# Patient Record
Sex: FEMALE | Race: WHITE | NOT HISPANIC OR LATINO | Employment: FULL TIME | URBAN - METROPOLITAN AREA
[De-identification: names, ages, dates, MRNs, and addresses within clinical notes are randomized per-mention and may not be internally consistent; named-entity substitution may affect disease eponyms.]

---

## 2017-01-30 ENCOUNTER — TELEPHONE (OUTPATIENT)
Dept: OBSTETRICS AND GYNECOLOGY | Facility: CLINIC | Age: 27
End: 2017-01-30

## 2017-01-30 RX ORDER — NORGESTIMATE AND ETHINYL ESTRADIOL 0.25-0.035
1 KIT ORAL DAILY
Qty: 28 TABLET | Refills: 11 | Status: SHIPPED | OUTPATIENT
Start: 2017-01-30 | End: 2017-02-27

## 2017-01-30 NOTE — TELEPHONE ENCOUNTER
Rx sent.    ----- Message from Amber Sims sent at 1/30/2017 11:09 AM EST -----  Contact: PATIENT  PATIENT CALLED REQUESTING SPRINTEC RX.

## 2018-02-23 ENCOUNTER — OFFICE VISIT (OUTPATIENT)
Dept: CARDIOLOGY | Facility: CLINIC | Age: 28
End: 2018-02-23

## 2018-02-23 VITALS
SYSTOLIC BLOOD PRESSURE: 136 MMHG | HEIGHT: 63 IN | HEART RATE: 77 BPM | BODY MASS INDEX: 27.64 KG/M2 | WEIGHT: 156 LBS | DIASTOLIC BLOOD PRESSURE: 86 MMHG

## 2018-02-23 DIAGNOSIS — I10 ESSENTIAL HYPERTENSION: ICD-10-CM

## 2018-02-23 DIAGNOSIS — R00.2 PALPITATIONS: Primary | ICD-10-CM

## 2018-02-23 PROCEDURE — 99204 OFFICE O/P NEW MOD 45 MIN: CPT | Performed by: INTERNAL MEDICINE

## 2018-02-23 PROCEDURE — 93000 ELECTROCARDIOGRAM COMPLETE: CPT | Performed by: INTERNAL MEDICINE

## 2018-02-23 RX ORDER — LOSARTAN POTASSIUM AND HYDROCHLOROTHIAZIDE 12.5; 5 MG/1; MG/1
TABLET ORAL
COMMUNITY
Start: 2018-01-26 | End: 2018-03-21 | Stop reason: SDUPTHER

## 2018-02-25 NOTE — PROGRESS NOTES
Date of Office Visit: 2018  Encounter Provider: Henrik Ramirez MD  Place of Service: [facility] CARDIOLOGY  Patient Name: Iwona Navarro  :1990    Chief complaint: Palpitations, hypertension.     History of Present Illness:    I had the pleasure of seeing the patient in cardiology office on 2018.  She is a   very pleasant 27 year-old white female with a history of hypertension who presents   for evaluation.  The patient was originally diagnosed with hypertension in ,   which time she states that her blood pressure was 164/123 at one time.  She was   started initially on lisinopril, although she developed a significant cough.  This was   later changed to losartan, and most recently was changed to losartan/HCTZ for   continuing hypertension in 2017.  Her blood pressure readings in   2018 continued to be elevated at times, including diastolic readings as high   as the lower 104, and systolic readings as high as 154.  However, recently, she   states that her blood pressure mainly runs in the upper 130s over upper 80s. Today,   she is 136/86.  She has also had palpitations intermittently which she states are fairly   mild.  She states that these are not severe, and she notices them more frequently   while in bed.  She has not had any shortness of breath or chest discomfort.  Her   EKG from today is normal other than sinus arrhythmia.    Past Medical History:   Diagnosis Date   • ACE-inhibitor cough    • Hypertension    • Migraines    • Palpitations        No past surgical history on file.    Current Outpatient Prescriptions on File Prior to Visit   Medication Sig Dispense Refill   • Norgestimate-Eth Estradiol (SPRINTEC 28 PO) Take  by mouth.     • SUMAtriptan (IMITREX) 50 MG tablet Take 50 mg by mouth Every 2 (Two) Hours As Needed for Migraine. Take one tablet at onset of headache. May repeat dose one time in 2 hours if headache not relieved.    "  • triamcinolone (KENALOG) 0.1 % lotion Apply  topically 3 (Three) Times a Day.       No current facility-administered medications on file prior to visit.      Allergies as of 02/23/2018 - Isaías as Reviewed 02/23/2018   Allergen Reaction Noted   • Ace inhibitors Cough 02/22/2018   • Penicillins Hives 02/22/2018     Social History     Social History   • Marital status:      Spouse name: N/A   • Number of children: N/A   • Years of education: N/A     Occupational History   • Not on file.     Social History Main Topics   • Smoking status: Former Smoker   • Smokeless tobacco: Never Used      Comment: Smoked 1 pack per week for 10 years - quit 2016   • Alcohol use Yes      Comment: Socially    • Drug use: No   • Sexual activity: Not on file     Other Topics Concern   • Not on file     Social History Narrative     Family History   Problem Relation Age of Onset   • Hypertension Father    • Diabetes Father    • Colon cancer Paternal Grandfather        Review of Systems   Constitution: Positive for malaise/fatigue.   Cardiovascular: Positive for palpitations.   Skin: Positive for rash.   All other systems reviewed and are negative.    Objective:     Vitals:    02/23/18 0913   BP: 136/86   Pulse: 77   Weight: 70.8 kg (156 lb)   Height: 160 cm (63\")     Body mass index is 27.63 kg/(m^2).    Physical Exam   Constitutional: She is oriented to person, place, and time. She appears well-developed and well-nourished.   HENT:   Head: Normocephalic and atraumatic.   Eyes: Conjunctivae are normal.   Neck: Neck supple.   Cardiovascular: Normal rate and regular rhythm.  Exam reveals no gallop and no friction rub.    No murmur heard.  Pulmonary/Chest: Effort normal and breath sounds normal.   Abdominal: Soft. There is no tenderness.   Musculoskeletal: She exhibits no edema.   Neurological: She is alert and oriented to person, place, and time.   Skin: Skin is warm.   Psychiatric: She has a normal mood and affect. Her behavior is " normal.     Lab Review:     ECG 12 Lead  Date/Time: 2/23/2018 9:20 AM  Performed by: ALOK CAN  Authorized by: ALOK CAN   Comparison: not compared with previous ECG   Previous ECG: no previous ECG available  Rhythm: sinus rhythm  Rate: normal  BPM: 77  Clinical impression: normal ECG          Assessment:       Diagnosis Plan   1. Palpitations  Adult Transthoracic Echo Complete W/ Cont if Necessary Per Protocol    Holter Monitor - 24 Hour   2. Essential hypertension  Adult Transthoracic Echo Complete W/ Cont if Necessary Per Protocol    Holter Monitor - 24 Hour     Plan:       As noted, the patient has had hypertension since last year, and there has been some   difficulty with controlling this.  Most recently, she was changed to losartan/HCTZ   50/12.5 mg per day.  She is mainly running in the 130s over 80s at this point.  The   most common cause of hypertension in younger people is essential hypertension.    However, at this age, secondary hypertension has to be considered.  She has had   a kidney ultrasound in the past, although this did not assess her renal arteries.  At   this point, I am going to look at the palpitations first, and then work on her blood   pressure.  I will start with a Holter monitor and echocardiogram.  If these are relatively   unrevealing, I will proceed with assessing her blood pressure further.  I will likely   obtain a CT angiogram of her renal arteries to assess for any potential renal artery   stenosis initially.  This would also show any obvious adrenal gland lesions.  Another   potential cause of her age would be hyperaldosteronism, although her potassium   has not been low on her basic metabolic panel in the past.  I will wait on testing to  adjust her medications.  Further plans initially will depend on the Holter and echo   results.

## 2018-03-05 ENCOUNTER — HOSPITAL ENCOUNTER (OUTPATIENT)
Dept: CARDIOLOGY | Facility: HOSPITAL | Age: 28
Discharge: HOME | End: 2018-03-05
Attending: INTERNAL MEDICINE | Admitting: INTERNAL MEDICINE

## 2018-03-05 VITALS
BODY MASS INDEX: 27.64 KG/M2 | HEART RATE: 100 BPM | SYSTOLIC BLOOD PRESSURE: 128 MMHG | HEIGHT: 63 IN | DIASTOLIC BLOOD PRESSURE: 68 MMHG | WEIGHT: 156 LBS

## 2018-03-05 DIAGNOSIS — R00.2 PALPITATIONS: ICD-10-CM

## 2018-03-05 DIAGNOSIS — I10 ESSENTIAL HYPERTENSION: ICD-10-CM

## 2018-03-05 LAB
ASCENDING AORTA: 2.5 CM
BH CV ECHO MEAS - ACS: 1.8 CM
BH CV ECHO MEAS - AO MAX PG (FULL): 2.3 MMHG
BH CV ECHO MEAS - AO MAX PG: 5.4 MMHG
BH CV ECHO MEAS - AO MEAN PG (FULL): 1.5 MMHG
BH CV ECHO MEAS - AO MEAN PG: 3.4 MMHG
BH CV ECHO MEAS - AO ROOT AREA (BSA CORRECTED): 1.6
BH CV ECHO MEAS - AO ROOT AREA: 6.2 CM^2
BH CV ECHO MEAS - AO ROOT DIAM: 2.8 CM
BH CV ECHO MEAS - AO V2 MAX: 116.1 CM/SEC
BH CV ECHO MEAS - AO V2 MEAN: 89.7 CM/SEC
BH CV ECHO MEAS - AO V2 VTI: 23.1 CM
BH CV ECHO MEAS - AVA(I,A): 1.5 CM^2
BH CV ECHO MEAS - AVA(I,D): 1.5 CM^2
BH CV ECHO MEAS - AVA(V,A): 1.3 CM^2
BH CV ECHO MEAS - AVA(V,D): 1.3 CM^2
BH CV ECHO MEAS - BSA(HAYCOCK): 1.8 M^2
BH CV ECHO MEAS - BSA: 1.7 M^2
BH CV ECHO MEAS - BZI_BMI: 27.6 KILOGRAMS/M^2
BH CV ECHO MEAS - BZI_METRIC_HEIGHT: 160 CM
BH CV ECHO MEAS - BZI_METRIC_WEIGHT: 70.8 KG
BH CV ECHO MEAS - CONTRAST EF (2CH): 58.7 ML/M^2
BH CV ECHO MEAS - CONTRAST EF 4CH: 64.9 ML/M^2
BH CV ECHO MEAS - EDV(MOD-SP2): 92 ML
BH CV ECHO MEAS - EDV(MOD-SP4): 74 ML
BH CV ECHO MEAS - EDV(TEICH): 109.5 ML
BH CV ECHO MEAS - EF(CUBED): 76.6 %
BH CV ECHO MEAS - EF(MOD-SP2): 58.7 %
BH CV ECHO MEAS - EF(MOD-SP4): 64.9 %
BH CV ECHO MEAS - EF(TEICH): 68.5 %
BH CV ECHO MEAS - ESV(MOD-SP2): 38 ML
BH CV ECHO MEAS - ESV(MOD-SP4): 26 ML
BH CV ECHO MEAS - ESV(TEICH): 34.5 ML
BH CV ECHO MEAS - FS: 38.4 %
BH CV ECHO MEAS - IVS/LVPW: 0.96
BH CV ECHO MEAS - IVSD: 0.73 CM
BH CV ECHO MEAS - LAT PEAK E' VEL: 18 CM/SEC
BH CV ECHO MEAS - LV DIASTOLIC VOL/BSA (35-75): 42.5 ML/M^2
BH CV ECHO MEAS - LV MASS(C)D: 117.3 GRAMS
BH CV ECHO MEAS - LV MASS(C)DI: 67.4 GRAMS/M^2
BH CV ECHO MEAS - LV MAX PG: 3 MMHG
BH CV ECHO MEAS - LV MEAN PG: 1.9 MMHG
BH CV ECHO MEAS - LV SYSTOLIC VOL/BSA (12-30): 14.9 ML/M^2
BH CV ECHO MEAS - LV V1 MAX: 87.3 CM/SEC
BH CV ECHO MEAS - LV V1 MEAN: 65.5 CM/SEC
BH CV ECHO MEAS - LV V1 VTI: 19.8 CM
BH CV ECHO MEAS - LVIDD: 4.8 CM
BH CV ECHO MEAS - LVIDS: 3 CM
BH CV ECHO MEAS - LVLD AP2: 7.7 CM
BH CV ECHO MEAS - LVLD AP4: 7.6 CM
BH CV ECHO MEAS - LVLS AP2: 6.8 CM
BH CV ECHO MEAS - LVLS AP4: 6.6 CM
BH CV ECHO MEAS - LVOT AREA (M): 1.8 CM^2
BH CV ECHO MEAS - LVOT AREA: 1.7 CM^2
BH CV ECHO MEAS - LVOT DIAM: 1.5 CM
BH CV ECHO MEAS - LVPWD: 0.76 CM
BH CV ECHO MEAS - MED PEAK E' VEL: 13 CM/SEC
BH CV ECHO MEAS - MV A DUR: 0.1 SEC
BH CV ECHO MEAS - MV A MAX VEL: 66.9 CM/SEC
BH CV ECHO MEAS - MV DEC SLOPE: 492.4 CM/SEC^2
BH CV ECHO MEAS - MV DEC TIME: 0.2 SEC
BH CV ECHO MEAS - MV E MAX VEL: 95.6 CM/SEC
BH CV ECHO MEAS - MV E/A: 1.4
BH CV ECHO MEAS - MV MAX PG: 4.4 MMHG
BH CV ECHO MEAS - MV MEAN PG: 1.8 MMHG
BH CV ECHO MEAS - MV P1/2T MAX VEL: 95.1 CM/SEC
BH CV ECHO MEAS - MV P1/2T: 56.6 MSEC
BH CV ECHO MEAS - MV V2 MAX: 105.2 CM/SEC
BH CV ECHO MEAS - MV V2 MEAN: 62.7 CM/SEC
BH CV ECHO MEAS - MV V2 VTI: 19.9 CM
BH CV ECHO MEAS - MVA P1/2T LCG: 2.3 CM^2
BH CV ECHO MEAS - MVA(P1/2T): 3.9 CM^2
BH CV ECHO MEAS - MVA(VTI): 1.7 CM^2
BH CV ECHO MEAS - PA ACC TIME: 0.18 SEC
BH CV ECHO MEAS - PA MAX PG (FULL): 1.9 MMHG
BH CV ECHO MEAS - PA MAX PG: 4.9 MMHG
BH CV ECHO MEAS - PA PR(ACCEL): -3.4 MMHG
BH CV ECHO MEAS - PA V2 MAX: 110.3 CM/SEC
BH CV ECHO MEAS - PULM A REVS DUR: 0.1 SEC
BH CV ECHO MEAS - PULM A REVS VEL: 38.3 CM/SEC
BH CV ECHO MEAS - PULM DIAS VEL: 62.6 CM/SEC
BH CV ECHO MEAS - PULM S/D: 0.92
BH CV ECHO MEAS - PULM SYS VEL: 57.7 CM/SEC
BH CV ECHO MEAS - PVA(V,A): 1.6 CM^2
BH CV ECHO MEAS - PVA(V,D): 1.6 CM^2
BH CV ECHO MEAS - QP/QS: 1.3
BH CV ECHO MEAS - RAP SYSTOLE: 8 MMHG
BH CV ECHO MEAS - RV MAX PG: 3 MMHG
BH CV ECHO MEAS - RV MEAN PG: 1.8 MMHG
BH CV ECHO MEAS - RV V1 MAX: 86.3 CM/SEC
BH CV ECHO MEAS - RV V1 MEAN: 63.3 CM/SEC
BH CV ECHO MEAS - RV V1 VTI: 21.4 CM
BH CV ECHO MEAS - RVOT AREA: 2 CM^2
BH CV ECHO MEAS - RVOT DIAM: 1.6 CM
BH CV ECHO MEAS - RVSP: 20 MMHG
BH CV ECHO MEAS - SI(AO): 82.5 ML/M^2
BH CV ECHO MEAS - SI(CUBED): 49.8 ML/M^2
BH CV ECHO MEAS - SI(LVOT): 19.6 ML/M^2
BH CV ECHO MEAS - SI(MOD-SP2): 31 ML/M^2
BH CV ECHO MEAS - SI(MOD-SP4): 27.6 ML/M^2
BH CV ECHO MEAS - SI(TEICH): 43.1 ML/M^2
BH CV ECHO MEAS - SUP REN AO DIAM: 1.4 CM
BH CV ECHO MEAS - SV(AO): 143.5 ML
BH CV ECHO MEAS - SV(CUBED): 86.7 ML
BH CV ECHO MEAS - SV(LVOT): 34.1 ML
BH CV ECHO MEAS - SV(MOD-SP2): 54 ML
BH CV ECHO MEAS - SV(MOD-SP4): 48 ML
BH CV ECHO MEAS - SV(RVOT): 43.1 ML
BH CV ECHO MEAS - SV(TEICH): 75 ML
BH CV ECHO MEAS - TAPSE (>1.6): 3.3 CM2
BH CV ECHO MEAS - TR MAX VEL: 176.5 CM/SEC
BH CV XLRA - RV BASE: 3 CM
BH CV XLRA - TDI S': 17 CM/SEC
E/E' RATIO: 7
LEFT ATRIUM VOLUME INDEX: 14 ML/M2
SINUS: 2.6 CM
STJ: 2 CM

## 2018-03-05 PROCEDURE — 93306 TTE W/DOPPLER COMPLETE: CPT

## 2018-03-05 PROCEDURE — 93306 TTE W/DOPPLER COMPLETE: CPT | Performed by: INTERNAL MEDICINE

## 2018-03-06 DIAGNOSIS — I15.9 SECONDARY HYPERTENSION: Primary | ICD-10-CM

## 2018-03-17 ENCOUNTER — HOSPITAL ENCOUNTER (OUTPATIENT)
Dept: CT IMAGING | Facility: HOSPITAL | Age: 28
Discharge: HOME | End: 2018-03-17
Attending: INTERNAL MEDICINE | Admitting: INTERNAL MEDICINE

## 2018-03-17 DIAGNOSIS — I15.9 SECONDARY HYPERTENSION: ICD-10-CM

## 2018-03-17 PROCEDURE — 82565 ASSAY OF CREATININE: CPT

## 2018-03-17 PROCEDURE — 25010000002 IOPAMIDOL 61 % SOLUTION: Performed by: INTERNAL MEDICINE

## 2018-03-17 PROCEDURE — 74175 CTA ABDOMEN W/CONTRAST: CPT

## 2018-03-17 RX ADMIN — IOPAMIDOL 95 ML: 612 INJECTION, SOLUTION INTRAVENOUS at 10:35

## 2018-03-18 LAB — CREAT BLDA-MCNC: 0.6 MG/DL (ref 0.6–1.3)

## 2018-03-21 DIAGNOSIS — I10 ESSENTIAL HYPERTENSION: Primary | ICD-10-CM

## 2018-03-21 RX ORDER — LOSARTAN POTASSIUM AND HYDROCHLOROTHIAZIDE 12.5; 5 MG/1; MG/1
2 TABLET ORAL DAILY
Qty: 60 TABLET | Refills: 11 | Status: SHIPPED | OUTPATIENT
Start: 2018-03-21 | End: 2018-06-22 | Stop reason: SDUPTHER

## 2018-04-02 ENCOUNTER — LAB (OUTPATIENT)
Dept: LAB | Facility: HOSPITAL | Age: 28
End: 2018-04-02
Attending: INTERNAL MEDICINE

## 2018-04-02 DIAGNOSIS — I10 ESSENTIAL HYPERTENSION: ICD-10-CM

## 2018-04-02 LAB
ANION GAP SERPL CALCULATED.3IONS-SCNC: 14.5 MMOL/L
BUN BLD-MCNC: 12 MG/DL (ref 6–20)
BUN/CREAT SERPL: 19.7 (ref 7–25)
CALCIUM SPEC-SCNC: 9.6 MG/DL (ref 8.6–10.5)
CHLORIDE SERPL-SCNC: 97 MMOL/L (ref 98–107)
CO2 SERPL-SCNC: 26.5 MMOL/L (ref 22–29)
CREAT BLD-MCNC: 0.61 MG/DL (ref 0.57–1)
GFR SERPL CREATININE-BSD FRML MDRD: 118 ML/MIN/1.73
GLUCOSE BLD-MCNC: 110 MG/DL (ref 65–99)
POTASSIUM BLD-SCNC: 3.2 MMOL/L (ref 3.5–5.2)
SODIUM BLD-SCNC: 138 MMOL/L (ref 136–145)

## 2018-04-02 PROCEDURE — 36415 COLL VENOUS BLD VENIPUNCTURE: CPT

## 2018-04-02 PROCEDURE — 80048 BASIC METABOLIC PNL TOTAL CA: CPT

## 2018-04-05 DIAGNOSIS — I10 ESSENTIAL HYPERTENSION: Primary | ICD-10-CM

## 2018-04-05 RX ORDER — POTASSIUM CHLORIDE 750 MG/1
10 TABLET, FILM COATED, EXTENDED RELEASE ORAL 2 TIMES DAILY
Qty: 60 TABLET | Refills: 11 | Status: SHIPPED | OUTPATIENT
Start: 2018-04-05 | End: 2018-07-23

## 2018-04-16 ENCOUNTER — LAB (OUTPATIENT)
Dept: LAB | Facility: HOSPITAL | Age: 28
End: 2018-04-16

## 2018-04-16 DIAGNOSIS — I10 ESSENTIAL HYPERTENSION: ICD-10-CM

## 2018-04-16 LAB
ANION GAP SERPL CALCULATED.3IONS-SCNC: 14.7 MMOL/L
BUN BLD-MCNC: 11 MG/DL (ref 6–20)
BUN/CREAT SERPL: 19.6 (ref 7–25)
CALCIUM SPEC-SCNC: 9.5 MG/DL (ref 8.6–10.5)
CHLORIDE SERPL-SCNC: 99 MMOL/L (ref 98–107)
CO2 SERPL-SCNC: 25.3 MMOL/L (ref 22–29)
CREAT BLD-MCNC: 0.56 MG/DL (ref 0.57–1)
GFR SERPL CREATININE-BSD FRML MDRD: 130 ML/MIN/1.73
GLUCOSE BLD-MCNC: 80 MG/DL (ref 65–99)
POTASSIUM BLD-SCNC: 4.1 MMOL/L (ref 3.5–5.2)
SODIUM BLD-SCNC: 139 MMOL/L (ref 136–145)

## 2018-04-16 PROCEDURE — 36415 COLL VENOUS BLD VENIPUNCTURE: CPT

## 2018-04-16 PROCEDURE — 80048 BASIC METABOLIC PNL TOTAL CA: CPT

## 2018-06-22 RX ORDER — LOSARTAN POTASSIUM AND HYDROCHLOROTHIAZIDE 12.5; 5 MG/1; MG/1
2 TABLET ORAL DAILY
Qty: 180 TABLET | Refills: 3 | Status: SHIPPED | OUTPATIENT
Start: 2018-06-22 | End: 2018-06-25 | Stop reason: SDUPTHER

## 2018-06-25 RX ORDER — LOSARTAN POTASSIUM AND HYDROCHLOROTHIAZIDE 12.5; 5 MG/1; MG/1
2 TABLET ORAL DAILY
Qty: 180 TABLET | Refills: 3 | Status: SHIPPED | OUTPATIENT
Start: 2018-06-25 | End: 2018-07-23

## 2018-07-23 ENCOUNTER — OFFICE VISIT (OUTPATIENT)
Dept: CARDIOLOGY | Facility: CLINIC | Age: 28
End: 2018-07-23

## 2018-07-23 VITALS
HEIGHT: 63 IN | BODY MASS INDEX: 28.53 KG/M2 | WEIGHT: 161 LBS | HEART RATE: 86 BPM | DIASTOLIC BLOOD PRESSURE: 88 MMHG | SYSTOLIC BLOOD PRESSURE: 122 MMHG

## 2018-07-23 DIAGNOSIS — R00.2 PALPITATIONS: ICD-10-CM

## 2018-07-23 DIAGNOSIS — I10 ESSENTIAL HYPERTENSION: Primary | ICD-10-CM

## 2018-07-23 PROCEDURE — 99213 OFFICE O/P EST LOW 20 MIN: CPT | Performed by: NURSE PRACTITIONER

## 2018-07-23 PROCEDURE — 93000 ELECTROCARDIOGRAM COMPLETE: CPT | Performed by: NURSE PRACTITIONER

## 2018-07-23 NOTE — PROGRESS NOTES
Date of Office Visit: 2018  Encounter Provider: KHRIS Barbour  Place of Service: [facility] CARDIOLOGY  Patient Name: Iwona Navarro  :1990    Chief Complaint   Patient presents with   • Hypertension   • Follow-up   :     HPI: Iowna Navarro is a 27 y.o. female is a patient of Dr. Ramirez. I am seeing her today for the first time and have reviewed her record.     Her past medical history is significant of hypertension, ACE inhibitor cough, migraines, palpitation, and history of tobacco use.    She was diagnosed with hypertension in .  At that time her blood pressure was 164/123.  She was started on lisinopril but developed a significant cough.  He was switched to losartan/HCTZ in 2017.  She was last seen in the office in 2018.  At that time her blood pressure was 136/86.  She also complained of palpitations and wore a 24-hour Holter monitor which revealed PACs less than 0.1% of total and 2 PVCs.  There were no atrial arrhythmias, supraventricular tachycardia or ventricular tachycardia.  A cardiogram on 3/5/2018 revealed normal left ventricle systolic function with the EF was 64.9%, diastolic dysfunction was normal, bicuspid aortic valve cannot be excluded and RVSP was normal.  Also had CT angiogram of the renal arteries which was negative for stenosis.    She presents today for reassessment.  She denies shortness of breath, chest pain, edema, fatigue, near-syncope, or syncope.  She continues to have palpitations which are not limiting.  These have not increased in frequency.  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  She began to have lightheadedness and started checking her blood pressure which was 110s-120/70.  Thus she states slowly weaned herself off of Hyzaar.  She has been checking her blood pressure at home which is consistently 120/70.  Her lightheadedness has  "much improved since coming off of Hyzaar.  She also reports that her migraines are better controlled.  She does not perform any structured exercise.  She tells me that she quit smoking one year ago as it was previously listed that she quit smoking in 2016 so she admitted to relapsing. She is currently abstinent but states that she misses smoking.       Allergies   Allergen Reactions   • Ace Inhibitors Cough   • Penicillins Hives       Past Medical History:   Diagnosis Date   • ACE-inhibitor cough    • Hypertension    • Migraines    • Palpitations        History reviewed. No pertinent surgical history.      Family and social history reviewed.     Review of Systems   Cardiovascular: Positive for palpitations.   Skin: Positive for itching (eczema RLE).     All other systems were reviewed and are negative          Objective:     Vitals:    07/23/18 1407   BP: 122/88   Pulse: 86   Weight: 73 kg (161 lb)   Height: 160 cm (63\")     Body mass index is 28.52 kg/m².    PHYSICAL EXAM:  Physical Exam   Constitutional: She is oriented to person, place, and time. She appears well-developed and well-nourished. No distress.   HENT:   Head: Normocephalic.   Eyes: Conjunctivae are normal.   Neck: Normal range of motion. No JVD present.   Cardiovascular: Normal rate, regular rhythm, normal heart sounds and intact distal pulses.    No murmur heard.  Pulses:       Carotid pulses are 2+ on the right side, and 2+ on the left side.       Radial pulses are 2+ on the right side, and 2+ on the left side.        Posterior tibial pulses are 2+ on the right side, and 2+ on the left side.   Pulmonary/Chest: Effort normal and breath sounds normal. No respiratory distress. She has no wheezes. She has no rhonchi. She has no rales. She exhibits no tenderness.   Abdominal: Soft. Bowel sounds are normal. She exhibits no distension.   Musculoskeletal: Normal range of motion. She exhibits no edema.   Neurological: She is alert and oriented to person, " place, and time.   Skin: Skin is warm, dry and intact. No rash noted. She is not diaphoretic. No cyanosis.   tattoos   Psychiatric: She has a normal mood and affect. Her behavior is normal. Judgment and thought content normal.         ECG 12 Lead  Date/Time: 7/23/2018 2:48 PM  Performed by: RIVAS CORBIN  Authorized by: RIVAS CORBIN   Comparison: compared with previous ECG from 2/23/2018  Similar to previous ECG  Rhythm: sinus rhythm  Rate: normal  BPM: 86  ST Segments: ST segments normal  T Waves: T waves normal  Clinical impression: normal ECG            Current Outpatient Prescriptions   Medication Sig Dispense Refill   • Multiple Vitamins-Minerals (MULTI COMPLETE PO) Take  by mouth.     • Norgestimate-Eth Estradiol (SPRINTEC 28 PO) Take  by mouth.     • SUMAtriptan (IMITREX) 50 MG tablet Take 50 mg by mouth Every 2 (Two) Hours As Needed for Migraine. Take one tablet at onset of headache. May repeat dose one time in 2 hours if headache not relieved.     • triamcinolone (KENALOG) 0.1 % lotion Apply  topically 3 (Three) Times a Day.     • Probiotic Product (PROBIOTIC ADVANCED PO) Take  by mouth.       No current facility-administered medications for this visit.      Assessment:       Diagnosis Plan   1. Essential hypertension     2. Palpitations          Orders Placed This Encounter   Procedures   • ECG 12 Lead     This order was created via procedure documentation         Plan:       1. Hypertension-blood pressure 122/88. Her home values are 120/70.  She has weaned herself off of Hyzaar since switching jobs and now working a less stressful job.  She was having lightheadedness which has also improved since coming off of Hyzaar. She will follow up in 6 months. She is to continue monitoring home blood pressures and call if >130/90    2. Palpitations-she wore a Holter monitor which showed rare PACs and PVCs. These are not any more frequent than normal. Continue to monitor    3. Historyof migraines- improved    4.  Former smoker- she stopped in 2016 then picked it back up. She is currently not using tobacco but stated that she misses it. I encouraged abstinence.    5. Eczema- treated with triamcinolone cream    Follow up in 6 months with Dr. Ramirez    Patient was instructed to call the office if new symptoms develop or report to nearest ER if heart attack or stroke is suspected.        It has been a pleasure to participate in this patient's care.      Thank you,  KHRIS Barbour      **Dariel Disclaimer:**  Much of this encounter note is an electronic transcription/translation of spoken language to printed text. The electronic translation of spoken language may permit erroneous, or at times, nonsensical words or phrases to be inadvertently transcribed. Although I have reviewed the note for such errors, some may still exist.

## 2019-01-28 ENCOUNTER — OFFICE VISIT (OUTPATIENT)
Dept: CARDIOLOGY | Facility: CLINIC | Age: 29
End: 2019-01-28

## 2019-01-28 VITALS
DIASTOLIC BLOOD PRESSURE: 72 MMHG | SYSTOLIC BLOOD PRESSURE: 122 MMHG | HEART RATE: 66 BPM | BODY MASS INDEX: 28.35 KG/M2 | WEIGHT: 160 LBS | HEIGHT: 63 IN | OXYGEN SATURATION: 99 %

## 2019-01-28 DIAGNOSIS — I10 ESSENTIAL HYPERTENSION: Primary | ICD-10-CM

## 2019-01-28 PROCEDURE — 99213 OFFICE O/P EST LOW 20 MIN: CPT | Performed by: NURSE PRACTITIONER

## 2019-01-28 RX ORDER — LOSARTAN POTASSIUM 25 MG/1
50 TABLET ORAL DAILY
Qty: 60 TABLET | Refills: 11 | Status: SHIPPED | OUTPATIENT
Start: 2019-01-28 | End: 2019-03-06

## 2019-01-28 RX ORDER — HYDROCHLOROTHIAZIDE 12.5 MG/1
12.5 CAPSULE, GELATIN COATED ORAL DAILY
Qty: 30 CAPSULE | Refills: 11 | Status: SHIPPED | OUTPATIENT
Start: 2019-01-28 | End: 2019-03-18

## 2019-01-28 RX ORDER — LOSARTAN POTASSIUM AND HYDROCHLOROTHIAZIDE 12.5; 5 MG/1; MG/1
0.5 TABLET ORAL DAILY
COMMUNITY
End: 2019-01-28

## 2019-01-28 NOTE — PROGRESS NOTES
Patient Name: Iwona Navarro  :1990  Age: 28 y.o.  Primary Cardiologist: Shorty Ramirez MD  Encounter Provider:  KHRIS Calloway      Chief Complaint:   Chief Complaint   Patient presents with   • Follow-up     6 months         HPI  Iwona Navarro is a 28 y.o. female with a history significant for hypertension, ACE inhibitor cough, migraines, palpitation and history of tobacco abuse.  Patient presents today for semiannual follow-up.  Patient is new to me but I have reviewed prior medical records.  Patient states that she has done well over the past 6 months, however 3 weeks ago she noted that her blood pressure was increasing.  Blood pressure was ranging in the 150s/90s.  Patient reports that she restarted her Hyzaar.  She reports that she began with a quarter of a tablet and then has now taking half a tablet.  She reports that she was also noted that her Hyzaar has been recalled and is needing a different prescription.  Patient denies any chest pain, shortness of breath, palpitations, swelling.  She does report occasional episodes of lightheadedness and generalized fatigue.    The following portions of the patient's history were reviewed and updated as appropriate: allergies, current medications, past family history, past medical history, past social history, past surgical history and problem list.    Current Outpatient Medications on File Prior to Visit   Medication Sig   • Multiple Vitamins-Minerals (MULTI COMPLETE PO) Take  by mouth.   • Norgestimate-Eth Estradiol (SPRINTEC 28 PO) Take  by mouth.   • Probiotic Product (PROBIOTIC ADVANCED PO) Take  by mouth.   • SUMAtriptan (IMITREX) 50 MG tablet Take 50 mg by mouth Every 2 (Two) Hours As Needed for Migraine. Take one tablet at onset of headache. May repeat dose one time in 2 hours if headache not relieved.   • triamcinolone (KENALOG) 0.1 % lotion Apply  topically 3 (Three) Times a Day.   • [DISCONTINUED] losartan-hydrochlorothiazide  "(HYZAAR) 50-12.5 MG per tablet Take 0.5 tablets by mouth Daily.     No current facility-administered medications on file prior to visit.          Review of Systems   Constitution: Positive for malaise/fatigue.   Cardiovascular: Negative for chest pain and leg swelling.   Respiratory: Negative for shortness of breath.    Neurological: Positive for light-headedness.   All other systems reviewed and are negative.      OBJECTIVE:   Vital Signs  Vitals:    01/28/19 1400   BP: 122/72   Pulse: 66   SpO2: 99%     Estimated body mass index is 28.34 kg/m² as calculated from the following:    Height as of this encounter: 160 cm (63\").    Weight as of this encounter: 72.6 kg (160 lb).    Physical Exam   Constitutional: She is oriented to person, place, and time. Vital signs are normal. She appears well-developed and well-nourished. She is active.   Eyes: Conjunctivae are normal.   Neck: Carotid bruit is not present.   Cardiovascular: Normal rate, regular rhythm and normal heart sounds.   Pulmonary/Chest: Breath sounds normal.   Abdominal: Normal appearance.   Musculoskeletal:   No cyanosis, clubbing, edema  Normal ROM   Neurological: She is alert and oriented to person, place, and time. GCS eye subscore is 4. GCS verbal subscore is 5. GCS motor subscore is 6.   Skin: Skin is warm, dry and intact.   Psychiatric: She has a normal mood and affect. Her speech is normal and behavior is normal. Judgment and thought content normal. Cognition and memory are normal.       Procedures    Cardiac Procedures:  1. 24-hour Holter 2/27/18: Normal monitor study.  2. Echocardiograms 3/5/18: EF 64.9%.  Left ventricular diastolic function is normal.  Normal right ventricular cavity size and systolic function.  Bicuspid aortic valve cannot be excluded.  Tachycardia related RVSP 20 mmHg.  There is no evidence of pericardial effusion.        ASSESSMENT:      Diagnosis Plan   1. Essential hypertension           PLAN OF CARE:     1. Hypertension: " Patient states that she was doing well over the past 6 months but her blood pressure recently began elevating again so she has restarted her Hyzaar.  Reports that he was averaging 150s/90s.  She is currently on half a tablet of losartan 50 mg/hydrochlorothiazide 12.5 mg.  She does note that this has been recalled and is requesting a different formulation.  She denies any chest pain, shortness of breath, palpitations, swelling.  She does admit occasional lightheadedness and fatigue.  Since patient prescription of losartan 25 mg and HCTZ 12.5 mg at supper prescriptions.  2. Follow-up with Dr. Ramirez in 6 months.  Sooner with any problems.        Thank you for allowing me to participate in the care of your patient,      Sincerely,   KHRIS Calloway  [facility]  01/28/19  2:30 PM    **Dariel Disclaimer:**  Much of this encounter note is an electronic transcription/translation of spoken language to printed text. The electronic translation of spoken language may permit erroneous, or at times, nonsensical words or phrases to be inadvertently transcribed. Although I have reviewed the note for such errors, some may still exist.

## 2019-03-06 ENCOUNTER — INITIAL PRENATAL (OUTPATIENT)
Dept: OBSTETRICS AND GYNECOLOGY | Facility: CLINIC | Age: 29
End: 2019-03-06

## 2019-03-06 ENCOUNTER — PROCEDURE VISIT (OUTPATIENT)
Dept: OBSTETRICS AND GYNECOLOGY | Facility: CLINIC | Age: 29
End: 2019-03-06

## 2019-03-06 VITALS — BODY MASS INDEX: 28.34 KG/M2 | WEIGHT: 160 LBS | SYSTOLIC BLOOD PRESSURE: 121 MMHG | DIASTOLIC BLOOD PRESSURE: 87 MMHG

## 2019-03-06 DIAGNOSIS — Z34.90 EARLY STAGE OF PREGNANCY: ICD-10-CM

## 2019-03-06 DIAGNOSIS — O36.80X1 PREGNANCY WITH UNCERTAIN FETAL VIABILITY, FETUS 1 OF MULTIPLE GESTATION: Primary | ICD-10-CM

## 2019-03-06 DIAGNOSIS — Z87.59 HISTORY OF MOLAR PREGNANCY: ICD-10-CM

## 2019-03-06 DIAGNOSIS — Z3A.01 7 WEEKS GESTATION OF PREGNANCY: ICD-10-CM

## 2019-03-06 DIAGNOSIS — O09.A0 H/O MOLAR PREGNANCY, ANTEPARTUM: ICD-10-CM

## 2019-03-06 DIAGNOSIS — O36.80X2 PREGNANCY WITH UNCERTAIN FETAL VIABILITY, FETUS 2 OF MULTIPLE GESTATION: ICD-10-CM

## 2019-03-06 DIAGNOSIS — O30.041 DICHORIONIC DIAMNIOTIC TWIN PREGNANCY IN FIRST TRIMESTER: Primary | ICD-10-CM

## 2019-03-06 PROBLEM — O10.919 CHRONIC HYPERTENSION AFFECTING PREGNANCY: Status: ACTIVE | Noted: 2019-03-06

## 2019-03-06 PROBLEM — O30.049 DICHORIONIC DIAMNIOTIC TWIN GESTATION: Status: ACTIVE | Noted: 2019-03-06

## 2019-03-06 PROCEDURE — 76817 TRANSVAGINAL US OBSTETRIC: CPT | Performed by: OBSTETRICS & GYNECOLOGY

## 2019-03-06 PROCEDURE — 0501F PRENATAL FLOW SHEET: CPT | Performed by: OBSTETRICS & GYNECOLOGY

## 2019-03-06 PROCEDURE — 81025 URINE PREGNANCY TEST: CPT | Performed by: OBSTETRICS & GYNECOLOGY

## 2019-03-06 RX ORDER — PRENATAL VIT NO.126/IRON/FOLIC 28MG-0.8MG
TABLET ORAL DAILY
COMMUNITY

## 2019-03-06 NOTE — PROGRESS NOTES
CC:  Initial ob visit  Patient presents for initial OB visit.  Patient states she is sure regarding her last menstrual period.  Ultrasound today shows 7-week 1 day di-di-twins.  Patient reports nausea but denies any vomiting.  Discussed over-the-counter Unisom and vitamin B6 and to call if symptoms are worsening or not responding to over-the-counter medication.  Patient has a history of a molar pregnancy in 2016 and her hCG was serially followed after suction D&C.  She had appropriate follow-up.  Patient has also been diagnosed with chronic hypertension and is currently on losartan.  Blood pressure is normal today.  Patient was advised to stop the medication and she will return in 2 weeks for blood pressure check.  Explained that if blood pressures increase, we will start labetalol.  Initial OB counseling was done today.  Discussed increased surveillance during pregnancy due to chronic hypertension and twin gestation.  OB lab work was ordered today.  A/P: Supervision of 7-week pregnancy of di-di-twins and history of molar pregnancy  --OB labwork today  --Followup in 2 weeks for bp check

## 2019-03-07 LAB
ABO GROUP BLD: (no result)
BASOPHILS # BLD AUTO: 0 X10E3/UL (ref 0–0.2)
BASOPHILS NFR BLD AUTO: 0 %
BLD GP AB SCN SERPL QL: NEGATIVE
EOSINOPHIL # BLD AUTO: 0.1 X10E3/UL (ref 0–0.4)
EOSINOPHIL NFR BLD AUTO: 2 %
ERYTHROCYTE [DISTWIDTH] IN BLOOD BY AUTOMATED COUNT: 12.4 % (ref 12.3–15.4)
HBV SURFACE AG SERPL QL IA: NEGATIVE
HCG INTACT+B SERPL-ACNC: NORMAL MIU/ML
HCT VFR BLD AUTO: 37.4 % (ref 34–46.6)
HCV AB S/CO SERPL IA: <0.1 S/CO RATIO (ref 0–0.9)
HGB BLD-MCNC: 12.9 G/DL (ref 11.1–15.9)
HIV 1+2 AB+HIV1 P24 AG SERPL QL IA: NON REACTIVE
IMM GRANULOCYTES # BLD AUTO: 0 X10E3/UL (ref 0–0.1)
IMM GRANULOCYTES NFR BLD AUTO: 0 %
LYMPHOCYTES # BLD AUTO: 1.8 X10E3/UL (ref 0.7–3.1)
LYMPHOCYTES NFR BLD AUTO: 23 %
MCH RBC QN AUTO: 30.6 PG (ref 26.6–33)
MCHC RBC AUTO-ENTMCNC: 34.5 G/DL (ref 31.5–35.7)
MCV RBC AUTO: 89 FL (ref 79–97)
MONOCYTES # BLD AUTO: 0.5 X10E3/UL (ref 0.1–0.9)
MONOCYTES NFR BLD AUTO: 6 %
NEUTROPHILS # BLD AUTO: 5.3 X10E3/UL (ref 1.4–7)
NEUTROPHILS NFR BLD AUTO: 69 %
PLATELET # BLD AUTO: 250 X10E3/UL (ref 150–379)
RBC # BLD AUTO: 4.22 X10E6/UL (ref 3.77–5.28)
RH BLD: POSITIVE
RPR SER QL: NON REACTIVE
RUBV IGG SERPL IA-ACNC: 1.38 INDEX
WBC # BLD AUTO: 7.7 X10E3/UL (ref 3.4–10.8)

## 2019-03-08 ENCOUNTER — TELEPHONE (OUTPATIENT)
Dept: OBSTETRICS AND GYNECOLOGY | Facility: CLINIC | Age: 29
End: 2019-03-08

## 2019-03-08 LAB
BACTERIA UR CULT: NO GROWTH
BACTERIA UR CULT: NORMAL
C TRACH RRNA CVX QL NAA+PROBE: NEGATIVE
CONV .: NORMAL
CYTOLOGIST CVX/VAG CYTO: NORMAL
CYTOLOGY CVX/VAG DOC THIN PREP: NORMAL
DX ICD CODE: NORMAL
HIV 1 & 2 AB SER-IMP: NORMAL
N GONORRHOEA RRNA CVX QL NAA+PROBE: NEGATIVE
OTHER STN SPEC: NORMAL
PATH REPORT.FINAL DX SPEC: NORMAL
STAT OF ADQ CVX/VAG CYTO-IMP: NORMAL
T VAGINALIS RRNA SPEC QL NAA+PROBE: NEGATIVE

## 2019-03-08 NOTE — TELEPHONE ENCOUNTER
----- Message from Margaret Marin MD sent at 3/8/2019  8:44 AM EST -----  Let patient know all of her labwork was normal

## 2019-03-14 LAB
B-HCG UR QL: POSITIVE
INTERNAL NEGATIVE CONTROL: NEGATIVE
INTERNAL POSITIVE CONTROL: POSITIVE
Lab: ABNORMAL

## 2019-03-18 ENCOUNTER — ROUTINE PRENATAL (OUTPATIENT)
Dept: OBSTETRICS AND GYNECOLOGY | Facility: CLINIC | Age: 29
End: 2019-03-18

## 2019-03-18 VITALS — WEIGHT: 160.2 LBS | DIASTOLIC BLOOD PRESSURE: 96 MMHG | BODY MASS INDEX: 28.38 KG/M2 | SYSTOLIC BLOOD PRESSURE: 142 MMHG

## 2019-03-18 DIAGNOSIS — Z3A.08 8 WEEKS GESTATION OF PREGNANCY: ICD-10-CM

## 2019-03-18 DIAGNOSIS — O10.919 CHRONIC HYPERTENSION AFFECTING PREGNANCY: Primary | ICD-10-CM

## 2019-03-18 DIAGNOSIS — O30.041 DICHORIONIC DIAMNIOTIC TWIN PREGNANCY IN FIRST TRIMESTER: ICD-10-CM

## 2019-03-18 PROCEDURE — 0502F SUBSEQUENT PRENATAL CARE: CPT | Performed by: OBSTETRICS & GYNECOLOGY

## 2019-03-18 RX ORDER — LABETALOL 200 MG/1
200 TABLET, FILM COATED ORAL 2 TIMES DAILY
Qty: 60 TABLET | Refills: 1 | Status: SHIPPED | OUTPATIENT
Start: 2019-03-18 | End: 2019-06-05 | Stop reason: SDUPTHER

## 2019-03-19 LAB
ALBUMIN SERPL-MCNC: 4.6 G/DL (ref 3.5–5.5)
ALBUMIN/GLOB SERPL: 1.8 {RATIO} (ref 1.2–2.2)
ALP SERPL-CCNC: 42 IU/L (ref 39–117)
ALT SERPL-CCNC: 21 IU/L (ref 0–32)
AST SERPL-CCNC: 18 IU/L (ref 0–40)
BILIRUB SERPL-MCNC: 0.3 MG/DL (ref 0–1.2)
BUN SERPL-MCNC: 7 MG/DL (ref 6–20)
BUN/CREAT SERPL: 15 (ref 9–23)
CALCIUM SERPL-MCNC: 9.6 MG/DL (ref 8.7–10.2)
CHLORIDE SERPL-SCNC: 102 MMOL/L (ref 96–106)
CO2 SERPL-SCNC: 20 MMOL/L (ref 20–29)
CREAT SERPL-MCNC: 0.46 MG/DL (ref 0.57–1)
GLOBULIN SER CALC-MCNC: 2.6 G/DL (ref 1.5–4.5)
GLUCOSE SERPL-MCNC: 89 MG/DL (ref 65–99)
POTASSIUM SERPL-SCNC: 3.9 MMOL/L (ref 3.5–5.2)
PROT SERPL-MCNC: 7.2 G/DL (ref 6–8.5)
SODIUM SERPL-SCNC: 138 MMOL/L (ref 134–144)

## 2019-03-19 NOTE — PROGRESS NOTES
CC:  BP check  Patient's losartan was stopped 2 weeks ago at her initial OB visit.  Her blood pressure today is noted to be mildly elevated.  Patient states she has been checking her blood pressure at home and they have been normal.  We will go ahead and start the patient on labetalol 200 mg twice a day.  We will also check a CMP today and she was given instructions to collect a baseline 24-hour urine protein.  Patient's ultrasound 2 weeks ago showed dichorionic diamniotic twins.  Reviewed ultrasound with patient and increased surveillance during pregnancy.  Patient states she overall has been feeling well and has no complaints today.  A/P:  Supervision of pregnancy at 8 weeks with d/di twins and chronic hypertension  --Start 200 mg labetalol bid  --CMP today and patient will collect a baseline 24 hour urine protein  --Followup in 2 weeks for bp recheck

## 2019-03-26 LAB
PROT 24H UR-MRATE: 112 MG/24 HR (ref 30–150)
PROT UR-MCNC: 9.7 MG/DL

## 2019-04-01 ENCOUNTER — ROUTINE PRENATAL (OUTPATIENT)
Dept: OBSTETRICS AND GYNECOLOGY | Facility: CLINIC | Age: 29
End: 2019-04-01

## 2019-04-01 VITALS — WEIGHT: 160.8 LBS | BODY MASS INDEX: 28.48 KG/M2 | SYSTOLIC BLOOD PRESSURE: 132 MMHG | DIASTOLIC BLOOD PRESSURE: 93 MMHG

## 2019-04-01 DIAGNOSIS — O10.919 CHRONIC HYPERTENSION AFFECTING PREGNANCY: Primary | ICD-10-CM

## 2019-04-01 DIAGNOSIS — Z3A.10 10 WEEKS GESTATION OF PREGNANCY: ICD-10-CM

## 2019-04-01 DIAGNOSIS — O30.041 DICHORIONIC DIAMNIOTIC TWIN PREGNANCY IN FIRST TRIMESTER: ICD-10-CM

## 2019-04-01 PROCEDURE — 0502F SUBSEQUENT PRENATAL CARE: CPT | Performed by: OBSTETRICS & GYNECOLOGY

## 2019-04-01 NOTE — PROGRESS NOTES
CC:  Pregnancy  Pt c/o nausea, but states it is manageable without meds.  She declines any rx for medication.  She also reports 2-3 headaches per week and had only taken tylenol a couple of times, which does help.  She was started on labetalol 200 mg bid two weeks ago.  Explained that headache could be related to labetalol, but should improve the longer she is on it.  If still persistent after several weeks, will start magnesium oxide.  Reviewed 24 hour urine protein re sults.  BP in mild range today and will keep her on labetalol 200 mg bid.  Patient desires NIPT and due to twin gestation, will have her come to the [place] office and  kit for Jacinta panorama testing.    A/P:  Supervision of pregnancy at 10 weeks with di/di twins chronic hypertension  --Patient to do jacinta panoramic testing  --Followup in 4 weeks

## 2019-04-16 ENCOUNTER — TELEPHONE (OUTPATIENT)
Dept: OBSTETRICS AND GYNECOLOGY | Facility: CLINIC | Age: 29
End: 2019-04-16

## 2019-04-16 NOTE — TELEPHONE ENCOUNTER
----- Message from Margaret Marin MD sent at 4/15/2019  2:39 PM EDT -----  Let patient know that her genetic test returned normal and if she wants to know gender, it showed two females.

## 2019-05-01 ENCOUNTER — ROUTINE PRENATAL (OUTPATIENT)
Dept: OBSTETRICS AND GYNECOLOGY | Facility: CLINIC | Age: 29
End: 2019-05-01

## 2019-05-01 VITALS — DIASTOLIC BLOOD PRESSURE: 89 MMHG | BODY MASS INDEX: 27.81 KG/M2 | SYSTOLIC BLOOD PRESSURE: 127 MMHG | WEIGHT: 157 LBS

## 2019-05-01 DIAGNOSIS — O30.042 DICHORIONIC DIAMNIOTIC TWIN PREGNANCY IN SECOND TRIMESTER: Primary | ICD-10-CM

## 2019-05-01 DIAGNOSIS — O10.919 CHRONIC HYPERTENSION AFFECTING PREGNANCY: ICD-10-CM

## 2019-05-01 DIAGNOSIS — Z3A.15 15 WEEKS GESTATION OF PREGNANCY: ICD-10-CM

## 2019-05-01 PROCEDURE — 0502F SUBSEQUENT PRENATAL CARE: CPT | Performed by: OBSTETRICS & GYNECOLOGY

## 2019-05-01 NOTE — PROGRESS NOTES
CC:  Pregnancy  Patient has no complaints.  She had cell free DNA testing that was low risk and showed two female fetuses.  Her blood pressure is normal today on labetalol 200 mg bid.  Discussed anatomy u/s in 4 weeks.  A/P:  Supervision of pregnancy at 15 weeks with di/di twins and chronic hypertension  --Followup in 4 weeks with anatomy u/s

## 2019-06-05 ENCOUNTER — ROUTINE PRENATAL (OUTPATIENT)
Dept: OBSTETRICS AND GYNECOLOGY | Facility: CLINIC | Age: 29
End: 2019-06-05

## 2019-06-05 ENCOUNTER — PROCEDURE VISIT (OUTPATIENT)
Dept: OBSTETRICS AND GYNECOLOGY | Facility: CLINIC | Age: 29
End: 2019-06-05

## 2019-06-05 VITALS — SYSTOLIC BLOOD PRESSURE: 113 MMHG | DIASTOLIC BLOOD PRESSURE: 75 MMHG | BODY MASS INDEX: 29.23 KG/M2 | WEIGHT: 165 LBS

## 2019-06-05 DIAGNOSIS — Z36.89 ENCOUNTER FOR FETAL ANATOMIC SURVEY: Primary | ICD-10-CM

## 2019-06-05 DIAGNOSIS — Z3A.20 20 WEEKS GESTATION OF PREGNANCY: ICD-10-CM

## 2019-06-05 DIAGNOSIS — O30.042 DICHORIONIC DIAMNIOTIC TWIN PREGNANCY IN SECOND TRIMESTER: Primary | ICD-10-CM

## 2019-06-05 DIAGNOSIS — O30.042 DICHORIONIC DIAMNIOTIC TWIN PREGNANCY IN SECOND TRIMESTER: ICD-10-CM

## 2019-06-05 DIAGNOSIS — O10.919 CHRONIC HYPERTENSION AFFECTING PREGNANCY: ICD-10-CM

## 2019-06-05 PROBLEM — O28.3 ECHOGENIC INTRACARDIAC FOCUS OF FETUS ON PRENATAL ULTRASOUND: Status: ACTIVE | Noted: 2019-06-05

## 2019-06-05 PROCEDURE — 76810 OB US >/= 14 WKS ADDL FETUS: CPT | Performed by: OBSTETRICS & GYNECOLOGY

## 2019-06-05 PROCEDURE — 76805 OB US >/= 14 WKS SNGL FETUS: CPT | Performed by: OBSTETRICS & GYNECOLOGY

## 2019-06-05 PROCEDURE — 0502F SUBSEQUENT PRENATAL CARE: CPT | Performed by: OBSTETRICS & GYNECOLOGY

## 2019-06-05 RX ORDER — LABETALOL 200 MG/1
200 TABLET, FILM COATED ORAL 2 TIMES DAILY
Qty: 60 TABLET | Refills: 1 | Status: SHIPPED | OUTPATIENT
Start: 2019-06-05 | End: 2019-08-16 | Stop reason: HOSPADM

## 2019-06-05 NOTE — PROGRESS NOTES
CC:  Pregnancy  Patient has no complaints.  She reports that she has been feeling well.  Her blood pressure is normal.  Anatomy ultrasound was done and was normal.  Reviewed ultrasound with her and explained to her that there was an echogenic intracardiac focus in baby B.  Explained that as an isolated finding, this has a 1% association with Down syndrome.  Patient had cell free DNA testing that was negative for Down syndrome.  Explained that we will check another growth ultrasound in 4 weeks.  Discussed 1 hour glucose test at her next visit and instructions given.  A/P: Supervision of pregnancy at 20 weeks with di-di-twins and chronic hypertension.  --Follow-up in 4 weeks with growth ultrasound  --1 hour glucose test at her next visit

## 2019-07-10 ENCOUNTER — ROUTINE PRENATAL (OUTPATIENT)
Dept: OBSTETRICS AND GYNECOLOGY | Facility: CLINIC | Age: 29
End: 2019-07-10

## 2019-07-10 ENCOUNTER — PROCEDURE VISIT (OUTPATIENT)
Dept: OBSTETRICS AND GYNECOLOGY | Facility: CLINIC | Age: 29
End: 2019-07-10

## 2019-07-10 VITALS — WEIGHT: 175 LBS | DIASTOLIC BLOOD PRESSURE: 78 MMHG | BODY MASS INDEX: 31 KG/M2 | SYSTOLIC BLOOD PRESSURE: 119 MMHG

## 2019-07-10 DIAGNOSIS — Z3A.25 25 WEEKS GESTATION OF PREGNANCY: ICD-10-CM

## 2019-07-10 DIAGNOSIS — O10.919 CHRONIC HYPERTENSION AFFECTING PREGNANCY: Primary | ICD-10-CM

## 2019-07-10 DIAGNOSIS — O10.919 CHRONIC HYPERTENSION AFFECTING PREGNANCY: ICD-10-CM

## 2019-07-10 DIAGNOSIS — O30.042 DICHORIONIC DIAMNIOTIC TWIN PREGNANCY IN SECOND TRIMESTER: ICD-10-CM

## 2019-07-10 DIAGNOSIS — Z36.89 ENCOUNTER FOR ULTRASOUND TO CHECK FETAL GROWTH: ICD-10-CM

## 2019-07-10 DIAGNOSIS — O30.049 TWIN PREGNANCY, DICHORIONIC/DIAMNIOTIC, UNSPECIFIED TRIMESTER: Primary | ICD-10-CM

## 2019-07-10 PROCEDURE — 0502F SUBSEQUENT PRENATAL CARE: CPT | Performed by: OBSTETRICS & GYNECOLOGY

## 2019-07-10 PROCEDURE — 76816 OB US FOLLOW-UP PER FETUS: CPT | Performed by: OBSTETRICS & GYNECOLOGY

## 2019-07-10 NOTE — PROGRESS NOTES
CC:  Pregnancy  Patient is doing well.  She does report trouble sleeping and advised her that she may use Benadryl or Unisom as needed.  A growth ultrasound was done of both babies today and growth is reassuring.  Growth discordance he is currently at 11%.  Patient reports good fetal movement of both babies.  She is doing her glucose test today.  A/P: Supervision of pregnancy at 25 weeks with di-di-twins and chronic hypertension  --Follow-up in 2 weeks

## 2019-07-11 ENCOUNTER — TELEPHONE (OUTPATIENT)
Dept: OBSTETRICS AND GYNECOLOGY | Facility: CLINIC | Age: 29
End: 2019-07-11

## 2019-07-11 PROBLEM — O99.019 MATERNAL ANEMIA IN PREGNANCY, ANTEPARTUM: Status: ACTIVE | Noted: 2019-07-11

## 2019-07-11 LAB
ERYTHROCYTE [DISTWIDTH] IN BLOOD BY AUTOMATED COUNT: 13.4 % (ref 12.3–15.4)
GLUCOSE 1H P 50 G GLC PO SERPL-MCNC: 91 MG/DL (ref 65–139)
HCT VFR BLD AUTO: 29.7 % (ref 34–46.6)
HGB BLD-MCNC: 9.4 G/DL (ref 12–15.9)
MCH RBC QN AUTO: 30.5 PG (ref 26.6–33)
MCHC RBC AUTO-ENTMCNC: 31.6 G/DL (ref 31.5–35.7)
MCV RBC AUTO: 96.4 FL (ref 79–97)
PLATELET # BLD AUTO: 204 10*3/MM3 (ref 140–450)
RBC # BLD AUTO: 3.08 10*6/MM3 (ref 3.77–5.28)
WBC # BLD AUTO: 8.62 10*3/MM3 (ref 3.4–10.8)

## 2019-07-11 RX ORDER — FERROUS SULFATE 325(65) MG
325 TABLET ORAL
Qty: 30 TABLET | Refills: 6 | Status: SHIPPED | OUTPATIENT
Start: 2019-07-11 | End: 2019-08-16 | Stop reason: HOSPADM

## 2019-07-11 NOTE — TELEPHONE ENCOUNTER
----- Message from Margaret Marin MD sent at 7/11/2019  9:22 AM EDT -----  Let patient know she passed her glucose test but she is anemic and I have sent in a prescription for iron.

## 2019-07-16 NOTE — TELEPHONE ENCOUNTER
Pt is aware. She said she started taking iron 325 mg OTC yesterday after she reviewed her results on My Chart.

## 2019-07-24 ENCOUNTER — ROUTINE PRENATAL (OUTPATIENT)
Dept: OBSTETRICS AND GYNECOLOGY | Facility: CLINIC | Age: 29
End: 2019-07-24

## 2019-07-24 VITALS — SYSTOLIC BLOOD PRESSURE: 128 MMHG | DIASTOLIC BLOOD PRESSURE: 86 MMHG | BODY MASS INDEX: 31.53 KG/M2 | WEIGHT: 178 LBS

## 2019-07-24 DIAGNOSIS — Z3A.27 27 WEEKS GESTATION OF PREGNANCY: ICD-10-CM

## 2019-07-24 DIAGNOSIS — O30.049 TWIN PREGNANCY, DICHORIONIC/DIAMNIOTIC, UNSPECIFIED TRIMESTER: Primary | ICD-10-CM

## 2019-07-24 DIAGNOSIS — O10.919 CHRONIC HYPERTENSION AFFECTING PREGNANCY: ICD-10-CM

## 2019-07-24 PROCEDURE — 0502F SUBSEQUENT PRENATAL CARE: CPT | Performed by: OBSTETRICS & GYNECOLOGY

## 2019-07-24 NOTE — PROGRESS NOTES
CC:  Pregnancy  Patient has no complaints.  She reports feeling well and reports good fetal movement of both babies.  Patient's blood pressure today is normal.  Patient passed her glucose test and is taking supplemental iron for anemia.  We plan on checking a growth ultrasound at her visit in 2 weeks.  A/P: Supervision of pregnancy at 27 weeks with di-di-twins and chronic hypertension  --Follow-up in 2 weeks with growth ultrasound

## 2019-08-12 ENCOUNTER — ANESTHESIA (OUTPATIENT)
Dept: LABOR AND DELIVERY | Facility: HOSPITAL | Age: 29
End: 2019-08-12

## 2019-08-12 ENCOUNTER — ANESTHESIA EVENT (OUTPATIENT)
Dept: LABOR AND DELIVERY | Facility: HOSPITAL | Age: 29
End: 2019-08-12

## 2019-08-12 ENCOUNTER — HOSPITAL ENCOUNTER (EMERGENCY)
Facility: HOSPITAL | Age: 29
End: 2019-08-12

## 2019-08-12 ENCOUNTER — HOSPITAL ENCOUNTER (INPATIENT)
Facility: HOSPITAL | Age: 29
LOS: 4 days | Discharge: HOME | End: 2019-08-16
Attending: OBSTETRICS & GYNECOLOGY | Admitting: OBSTETRICS & GYNECOLOGY

## 2019-08-12 PROBLEM — Z34.90 PREGNANCY: Status: ACTIVE | Noted: 2019-08-12

## 2019-08-12 LAB
ABO GROUP BLD: NORMAL
ALBUMIN SERPL-MCNC: 3.7 G/DL (ref 3.5–5.2)
ALBUMIN/GLOB SERPL: 1.5 G/DL
ALP SERPL-CCNC: 74 U/L (ref 39–117)
ALT SERPL W P-5'-P-CCNC: 114 U/L (ref 1–33)
ANION GAP SERPL CALCULATED.3IONS-SCNC: 14.4 MMOL/L (ref 5–15)
AST SERPL-CCNC: 60 U/L (ref 1–32)
ATMOSPHERIC PRESS: 750.6 MMHG
ATMOSPHERIC PRESS: 751 MMHG
BACTERIA UR QL AUTO: ABNORMAL /HPF
BASE EXCESS BLDCOA CALC-SCNC: -2.4 MMOL/L
BASE EXCESS BLDCOA CALC-SCNC: -2.5 MMOL/L
BDY SITE: ABNORMAL
BDY SITE: ABNORMAL
BILIRUB SERPL-MCNC: 0.4 MG/DL (ref 0.2–1.2)
BILIRUB UR QL STRIP: NEGATIVE
BLD GP AB SCN SERPL QL: NEGATIVE
BUN BLD-MCNC: 6 MG/DL (ref 6–20)
BUN/CREAT SERPL: 11.5 (ref 7–25)
CALCIUM SPEC-SCNC: 9.1 MG/DL (ref 8.6–10.5)
CHLORIDE SERPL-SCNC: 106 MMOL/L (ref 98–107)
CLARITY UR: ABNORMAL
CO2 SERPL-SCNC: 17.6 MMOL/L (ref 22–29)
COLOR UR: YELLOW
CREAT BLD-MCNC: 0.52 MG/DL (ref 0.57–1)
DEPRECATED RDW RBC AUTO: 45.7 FL (ref 37–54)
ERYTHROCYTE [DISTWIDTH] IN BLOOD BY AUTOMATED COUNT: 13.4 % (ref 12.3–15.4)
GFR SERPL CREATININE-BSD FRML MDRD: 140 ML/MIN/1.73
GLOBULIN UR ELPH-MCNC: 2.4 GM/DL
GLUCOSE BLD-MCNC: 85 MG/DL (ref 65–99)
GLUCOSE UR STRIP-MCNC: NEGATIVE MG/DL
HCO3 BLDCOA-SCNC: 22.6 MMOL/L (ref 22–28)
HCO3 BLDCOA-SCNC: 24.1 MMOL/L (ref 22–28)
HCT VFR BLD AUTO: 36.2 % (ref 34–46.6)
HGB BLD-MCNC: 12 G/DL (ref 12–15.9)
HGB UR QL STRIP.AUTO: ABNORMAL
HYALINE CASTS UR QL AUTO: ABNORMAL /LPF
KETONES UR QL STRIP: NEGATIVE
LEUKOCYTE ESTERASE UR QL STRIP.AUTO: ABNORMAL
MCH RBC QN AUTO: 30.8 PG (ref 26.6–33)
MCHC RBC AUTO-ENTMCNC: 33.1 G/DL (ref 31.5–35.7)
MCV RBC AUTO: 93.1 FL (ref 79–97)
MODALITY: ABNORMAL
MODALITY: ABNORMAL
NITRITE UR QL STRIP: NEGATIVE
NOTE: ABNORMAL
NOTE: ABNORMAL
PCO2 BLDCOA: 39.7 MMHG
PCO2 BLDCOA: 46.9 MMHG
PH BLDCOA: 7.32 PH UNITS (ref 7.18–7.34)
PH BLDCOA: 7.36 PH UNITS (ref 7.18–7.34)
PH UR STRIP.AUTO: 6.5 [PH] (ref 5–8)
PLATELET # BLD AUTO: 192 10*3/MM3 (ref 140–450)
PMV BLD AUTO: 12.4 FL (ref 6–12)
PO2 BLDCOA: 13.9 MMHG (ref 12–26)
PO2 BLDCOA: 20.6 MMHG (ref 12–26)
POTASSIUM BLD-SCNC: 3.7 MMOL/L (ref 3.5–5.2)
PROT SERPL-MCNC: 6.1 G/DL (ref 6–8.5)
PROT UR QL STRIP: NEGATIVE
RBC # BLD AUTO: 3.89 10*6/MM3 (ref 3.77–5.28)
RBC # UR: ABNORMAL /HPF
REF LAB TEST METHOD: ABNORMAL
RH BLD: POSITIVE
SAO2 % BLDCOA: 14.6 % (ref 92–99)
SAO2 % BLDCOA: 31.8 % (ref 92–99)
SODIUM BLD-SCNC: 138 MMOL/L (ref 136–145)
SP GR UR STRIP: 1.02 (ref 1–1.03)
SQUAMOUS #/AREA URNS HPF: ABNORMAL /HPF
T&S EXPIRATION DATE: NORMAL
UROBILINOGEN UR QL STRIP: ABNORMAL
WBC NRBC COR # BLD: 11.87 10*3/MM3 (ref 3.4–10.8)
WBC UR QL AUTO: ABNORMAL /HPF

## 2019-08-12 PROCEDURE — 82803 BLOOD GASES ANY COMBINATION: CPT

## 2019-08-12 PROCEDURE — 85027 COMPLETE CBC AUTOMATED: CPT | Performed by: OBSTETRICS & GYNECOLOGY

## 2019-08-12 PROCEDURE — 85025 COMPLETE CBC W/AUTO DIFF WBC: CPT | Performed by: OBSTETRICS & GYNECOLOGY

## 2019-08-12 PROCEDURE — 88307 TISSUE EXAM BY PATHOLOGIST: CPT

## 2019-08-12 PROCEDURE — 87076 CULTURE ANAEROBE IDENT EACH: CPT | Performed by: OBSTETRICS & GYNECOLOGY

## 2019-08-12 PROCEDURE — 25010000002 BETAMETHASONE ACET & SOD PHOS PER 4 MG: Performed by: OBSTETRICS & GYNECOLOGY

## 2019-08-12 PROCEDURE — 87086 URINE CULTURE/COLONY COUNT: CPT | Performed by: OBSTETRICS & GYNECOLOGY

## 2019-08-12 PROCEDURE — 25010000002 KETOROLAC TROMETHAMINE PER 15 MG: Performed by: ANESTHESIOLOGY

## 2019-08-12 PROCEDURE — 25010000002 MORPHINE PER 10 MG: Performed by: ANESTHESIOLOGY

## 2019-08-12 PROCEDURE — 25010000002 ONDANSETRON PER 1 MG: Performed by: ANESTHESIOLOGY

## 2019-08-12 PROCEDURE — 86900 BLOOD TYPING SEROLOGIC ABO: CPT | Performed by: OBSTETRICS & GYNECOLOGY

## 2019-08-12 PROCEDURE — 81001 URINALYSIS AUTO W/SCOPE: CPT | Performed by: OBSTETRICS & GYNECOLOGY

## 2019-08-12 PROCEDURE — 85007 BL SMEAR W/DIFF WBC COUNT: CPT | Performed by: OBSTETRICS & GYNECOLOGY

## 2019-08-12 PROCEDURE — 80053 COMPREHEN METABOLIC PANEL: CPT | Performed by: OBSTETRICS & GYNECOLOGY

## 2019-08-12 PROCEDURE — 59510 CESAREAN DELIVERY: CPT | Performed by: OBSTETRICS & GYNECOLOGY

## 2019-08-12 PROCEDURE — 86850 RBC ANTIBODY SCREEN: CPT | Performed by: OBSTETRICS & GYNECOLOGY

## 2019-08-12 PROCEDURE — 25010000002 PHENYLEPHRINE PER 1 ML: Performed by: ANESTHESIOLOGY

## 2019-08-12 PROCEDURE — 86901 BLOOD TYPING SEROLOGIC RH(D): CPT | Performed by: OBSTETRICS & GYNECOLOGY

## 2019-08-12 PROCEDURE — 25010000002 GENTAMICIN PER 80 MG: Performed by: OBSTETRICS & GYNECOLOGY

## 2019-08-12 RX ORDER — FAMOTIDINE 10 MG/ML
20 INJECTION, SOLUTION INTRAVENOUS ONCE
Status: DISCONTINUED | OUTPATIENT
Start: 2019-08-12 | End: 2019-08-13

## 2019-08-12 RX ORDER — KETOROLAC TROMETHAMINE 30 MG/ML
INJECTION, SOLUTION INTRAMUSCULAR; INTRAVENOUS AS NEEDED
Status: DISCONTINUED | OUTPATIENT
Start: 2019-08-12 | End: 2019-08-12 | Stop reason: SURG

## 2019-08-12 RX ORDER — ACETAMINOPHEN 500 MG
1000 TABLET ORAL ONCE
Status: DISCONTINUED | OUTPATIENT
Start: 2019-08-12 | End: 2019-08-12 | Stop reason: HOSPADM

## 2019-08-12 RX ORDER — SODIUM CHLORIDE 0.9 % (FLUSH) 0.9 %
3-10 SYRINGE (ML) INJECTION AS NEEDED
Status: DISCONTINUED | OUTPATIENT
Start: 2019-08-12 | End: 2019-08-12 | Stop reason: HOSPADM

## 2019-08-12 RX ORDER — MISOPROSTOL 200 UG/1
800 TABLET ORAL AS NEEDED
Status: DISCONTINUED | OUTPATIENT
Start: 2019-08-12 | End: 2019-08-12 | Stop reason: HOSPADM

## 2019-08-12 RX ORDER — BUPIVACAINE HYDROCHLORIDE 7.5 MG/ML
INJECTION, SOLUTION EPIDURAL; RETROBULBAR AS NEEDED
Status: DISCONTINUED | OUTPATIENT
Start: 2019-08-12 | End: 2019-08-12 | Stop reason: SURG

## 2019-08-12 RX ORDER — MAGNESIUM SULFATE HEPTAHYDRATE 40 MG/ML
2 INJECTION, SOLUTION INTRAVENOUS CONTINUOUS
Status: DISCONTINUED | OUTPATIENT
Start: 2019-08-12 | End: 2019-08-13

## 2019-08-12 RX ORDER — PHYTONADIONE 2 MG/ML
INJECTION, EMULSION INTRAMUSCULAR; INTRAVENOUS; SUBCUTANEOUS
Status: DISPENSED
Start: 2019-08-12 | End: 2019-08-12

## 2019-08-12 RX ORDER — OXYCODONE HYDROCHLORIDE AND ACETAMINOPHEN 5; 325 MG/1; MG/1
2 TABLET ORAL EVERY 4 HOURS PRN
Status: DISCONTINUED | OUTPATIENT
Start: 2019-08-12 | End: 2019-08-16 | Stop reason: HOSPADM

## 2019-08-12 RX ORDER — SIMETHICONE 80 MG
80 TABLET,CHEWABLE ORAL 4 TIMES DAILY PRN
Status: DISCONTINUED | OUTPATIENT
Start: 2019-08-12 | End: 2019-08-16 | Stop reason: HOSPADM

## 2019-08-12 RX ORDER — OXYTOCIN-SODIUM CHLORIDE 0.9% IV SOLN 30 UNIT/500ML 30-0.9/5 UT/ML-%
SOLUTION INTRAVENOUS
Status: COMPLETED
Start: 2019-08-12 | End: 2019-08-12

## 2019-08-12 RX ORDER — LIDOCAINE HYDROCHLORIDE 10 MG/ML
5 INJECTION, SOLUTION EPIDURAL; INFILTRATION; INTRACAUDAL; PERINEURAL AS NEEDED
Status: DISCONTINUED | OUTPATIENT
Start: 2019-08-12 | End: 2019-08-12 | Stop reason: HOSPADM

## 2019-08-12 RX ORDER — ONDANSETRON 4 MG/1
4 TABLET, FILM COATED ORAL EVERY 8 HOURS PRN
Status: DISCONTINUED | OUTPATIENT
Start: 2019-08-12 | End: 2019-08-16 | Stop reason: HOSPADM

## 2019-08-12 RX ORDER — CALCIUM GLUCONATE 94 MG/ML
1 INJECTION, SOLUTION INTRAVENOUS ONCE AS NEEDED
Status: DISCONTINUED | OUTPATIENT
Start: 2019-08-12 | End: 2019-08-12 | Stop reason: HOSPADM

## 2019-08-12 RX ORDER — OXYTOCIN-SODIUM CHLORIDE 0.9% IV SOLN 30 UNIT/500ML 30-0.9/5 UT/ML-%
999 SOLUTION INTRAVENOUS ONCE
Status: DISCONTINUED | OUTPATIENT
Start: 2019-08-12 | End: 2019-08-12 | Stop reason: HOSPADM

## 2019-08-12 RX ORDER — ERYTHROMYCIN 5 MG/G
OINTMENT OPHTHALMIC
Status: DISPENSED
Start: 2019-08-12 | End: 2019-08-12

## 2019-08-12 RX ORDER — DOCUSATE SODIUM 100 MG/1
100 CAPSULE, LIQUID FILLED ORAL 2 TIMES DAILY PRN
Status: DISCONTINUED | OUTPATIENT
Start: 2019-08-12 | End: 2019-08-16 | Stop reason: HOSPADM

## 2019-08-12 RX ORDER — CLINDAMYCIN PHOSPHATE 900 MG/50ML
900 INJECTION INTRAVENOUS EVERY 8 HOURS
Status: DISCONTINUED | OUTPATIENT
Start: 2019-08-12 | End: 2019-08-13

## 2019-08-12 RX ORDER — LANOLIN
CREAM (ML) TOPICAL
Status: DISCONTINUED | OUTPATIENT
Start: 2019-08-12 | End: 2019-08-16 | Stop reason: HOSPADM

## 2019-08-12 RX ORDER — MORPHINE SULFATE 1 MG/ML
INJECTION, SOLUTION EPIDURAL; INTRATHECAL; INTRAVENOUS AS NEEDED
Status: DISCONTINUED | OUTPATIENT
Start: 2019-08-12 | End: 2019-08-12 | Stop reason: SURG

## 2019-08-12 RX ORDER — OXYTOCIN-SODIUM CHLORIDE 0.9% IV SOLN 30 UNIT/500ML 30-0.9/5 UT/ML-%
125 SOLUTION INTRAVENOUS CONTINUOUS PRN
Status: COMPLETED | OUTPATIENT
Start: 2019-08-12 | End: 2019-08-12

## 2019-08-12 RX ORDER — IBUPROFEN 600 MG/1
600 TABLET ORAL EVERY 8 HOURS PRN
Status: DISCONTINUED | OUTPATIENT
Start: 2019-08-12 | End: 2019-08-16 | Stop reason: HOSPADM

## 2019-08-12 RX ORDER — ACETAMINOPHEN 500 MG
1000 TABLET ORAL ONCE
Status: DISCONTINUED | OUTPATIENT
Start: 2019-08-12 | End: 2019-08-13

## 2019-08-12 RX ORDER — OXYTOCIN-SODIUM CHLORIDE 0.9% IV SOLN 30 UNIT/500ML 30-0.9/5 UT/ML-%
250 SOLUTION INTRAVENOUS CONTINUOUS
Status: ACTIVE | OUTPATIENT
Start: 2019-08-12 | End: 2019-08-12

## 2019-08-12 RX ORDER — METHYLERGONOVINE MALEATE 0.2 MG/ML
200 INJECTION INTRAVENOUS ONCE AS NEEDED
Status: DISCONTINUED | OUTPATIENT
Start: 2019-08-12 | End: 2019-08-12 | Stop reason: HOSPADM

## 2019-08-12 RX ORDER — DEXTROSE, SODIUM CHLORIDE, SODIUM LACTATE, POTASSIUM CHLORIDE, AND CALCIUM CHLORIDE 5; .6; .31; .03; .02 G/100ML; G/100ML; G/100ML; G/100ML; G/100ML
125 INJECTION, SOLUTION INTRAVENOUS CONTINUOUS
Status: DISCONTINUED | OUTPATIENT
Start: 2019-08-12 | End: 2019-08-16 | Stop reason: HOSPADM

## 2019-08-12 RX ORDER — FAMOTIDINE 10 MG/ML
20 INJECTION, SOLUTION INTRAVENOUS ONCE AS NEEDED
Status: DISCONTINUED | OUTPATIENT
Start: 2019-08-12 | End: 2019-08-12 | Stop reason: HOSPADM

## 2019-08-12 RX ORDER — BISACODYL 10 MG
10 SUPPOSITORY, RECTAL RECTAL DAILY PRN
Status: DISCONTINUED | OUTPATIENT
Start: 2019-08-12 | End: 2019-08-16 | Stop reason: HOSPADM

## 2019-08-12 RX ORDER — DOCUSATE SODIUM 100 MG/1
100 CAPSULE, LIQUID FILLED ORAL 2 TIMES DAILY
COMMUNITY
End: 2019-09-24

## 2019-08-12 RX ORDER — SODIUM CHLORIDE 0.9 % (FLUSH) 0.9 %
3 SYRINGE (ML) INJECTION EVERY 12 HOURS SCHEDULED
Status: DISCONTINUED | OUTPATIENT
Start: 2019-08-12 | End: 2019-08-12 | Stop reason: HOSPADM

## 2019-08-12 RX ORDER — ONDANSETRON 2 MG/ML
4 INJECTION INTRAMUSCULAR; INTRAVENOUS EVERY 6 HOURS PRN
Status: DISCONTINUED | OUTPATIENT
Start: 2019-08-12 | End: 2019-08-16 | Stop reason: HOSPADM

## 2019-08-12 RX ORDER — ONDANSETRON 2 MG/ML
4 INJECTION INTRAMUSCULAR; INTRAVENOUS ONCE
Status: COMPLETED | OUTPATIENT
Start: 2019-08-12 | End: 2019-08-12

## 2019-08-12 RX ORDER — CARBOPROST TROMETHAMINE 250 UG/ML
250 INJECTION, SOLUTION INTRAMUSCULAR AS NEEDED
Status: DISCONTINUED | OUTPATIENT
Start: 2019-08-12 | End: 2019-08-12 | Stop reason: HOSPADM

## 2019-08-12 RX ORDER — PRENATAL VIT NO.126/IRON/FOLIC 28MG-0.8MG
1 TABLET ORAL DAILY
Status: DISCONTINUED | OUTPATIENT
Start: 2019-08-13 | End: 2019-08-16 | Stop reason: HOSPADM

## 2019-08-12 RX ORDER — BETAMETHASONE SODIUM PHOSPHATE AND BETAMETHASONE ACETATE 3; 3 MG/ML; MG/ML
12 INJECTION, SUSPENSION INTRA-ARTICULAR; INTRALESIONAL; INTRAMUSCULAR; SOFT TISSUE EVERY 24 HOURS
Status: DISCONTINUED | OUTPATIENT
Start: 2019-08-12 | End: 2019-08-13

## 2019-08-12 RX ORDER — MAGNESIUM SULFATE HEPTAHYDRATE 40 MG/ML
INJECTION, SOLUTION INTRAVENOUS
Status: COMPLETED
Start: 2019-08-12 | End: 2019-08-12

## 2019-08-12 RX ORDER — TERBUTALINE SULFATE 1 MG/ML
0.25 INJECTION, SOLUTION SUBCUTANEOUS ONCE
Status: DISCONTINUED | OUTPATIENT
Start: 2019-08-12 | End: 2019-08-13

## 2019-08-12 RX ORDER — LABETALOL 200 MG/1
200 TABLET, FILM COATED ORAL 2 TIMES DAILY
Status: DISCONTINUED | OUTPATIENT
Start: 2019-08-12 | End: 2019-08-13

## 2019-08-12 RX ADMIN — OXYTOCIN-SODIUM CHLORIDE 0.9% IV SOLN 30 UNIT/500ML 125 ML/HR: 30-0.9/5 SOLUTION at 07:52

## 2019-08-12 RX ADMIN — OXYTOCIN 125 ML/HR: 10 INJECTION INTRAVENOUS at 07:52

## 2019-08-12 RX ADMIN — BUPIVACAINE HYDROCHLORIDE 1.8 ML: 7.5 INJECTION, SOLUTION EPIDURAL; RETROBULBAR at 05:58

## 2019-08-12 RX ADMIN — GENTAMICIN SULFATE 316 MG: 40 INJECTION, SOLUTION INTRAMUSCULAR; INTRAVENOUS at 06:05

## 2019-08-12 RX ADMIN — ONDANSETRON 4 MG: 2 INJECTION INTRAMUSCULAR; INTRAVENOUS at 06:17

## 2019-08-12 RX ADMIN — CLINDAMYCIN PHOSPHATE 900 MG: 900 INJECTION, SOLUTION INTRAVENOUS at 05:24

## 2019-08-12 RX ADMIN — SODIUM CHLORIDE, POTASSIUM CHLORIDE, SODIUM LACTATE AND CALCIUM CHLORIDE 1000 ML: 600; 310; 30; 20 INJECTION, SOLUTION INTRAVENOUS at 05:07

## 2019-08-12 RX ADMIN — CLINDAMYCIN PHOSPHATE 900 MG: 900 INJECTION, SOLUTION INTRAVENOUS at 21:13

## 2019-08-12 RX ADMIN — BETAMETHASONE SODIUM PHOSPHATE AND BETAMETHASONE ACETATE 12 MG: 3; 3 INJECTION, SUSPENSION INTRA-ARTICULAR; INTRALESIONAL; INTRAMUSCULAR at 05:17

## 2019-08-12 RX ADMIN — IBUPROFEN 600 MG: 600 TABLET ORAL at 17:13

## 2019-08-12 RX ADMIN — KETOROLAC TROMETHAMINE 30 MG: 30 INJECTION, SOLUTION INTRAMUSCULAR; INTRAVENOUS at 06:18

## 2019-08-12 RX ADMIN — DOCUSATE SODIUM 100 MG: 100 CAPSULE, LIQUID FILLED ORAL at 17:13

## 2019-08-12 RX ADMIN — MORPHINE SULFATE 0.2 MG: 1 INJECTION EPIDURAL; INTRATHECAL; INTRAVENOUS at 05:58

## 2019-08-12 RX ADMIN — MAGNESIUM SULFATE IN WATER 2 G/HR: 40 INJECTION, SOLUTION INTRAVENOUS at 05:54

## 2019-08-12 RX ADMIN — SIMETHICONE CHEW TAB 80 MG 80 MG: 80 TABLET ORAL at 17:13

## 2019-08-12 RX ADMIN — PHENYLEPHRINE HYDROCHLORIDE 100 MCG: 10 INJECTION INTRAVENOUS at 06:00

## 2019-08-12 NOTE — ANESTHESIA POSTPROCEDURE EVALUATION
Patient: Iwona Navarro    Procedure Summary     Date:  19 Room / Location:   [facility]  /  [facility]    Anesthesia Start:  554 Anesthesia Stop:  655    Procedure:   SECTION PRIMARY (N/A Abdomen) Diagnosis:  (twins  labor malpresentation)    Surgeon:  Derek Galaviz MD Provider:  Jim Mills MD    Anesthesia Type:  spinal ASA Status:  2 - Emergent          Anesthesia Type: spinal  Last vitals  BP   125/75 (19)   Temp   36.6 °C (97.8 °F) (19 0745)   Pulse   81 (19)   Resp   16 (19)     SpO2   97 % (19)     Post Anesthesia Care and Evaluation    Patient location during evaluation: bedside  Patient participation: complete - patient participated  Level of consciousness: awake and alert  Pain management: adequate  Airway patency: patent  Anesthetic complications: No anesthetic complications    Cardiovascular status: acceptable  Respiratory status: acceptable  Hydration status: acceptable    Comments: /75 (BP Location: Right arm, Patient Position: Sitting)   Pulse 81   Temp 36.6 °C (97.8 °F) (Oral)   Resp 16   Wt 79.4 kg (175 lb)   LMP 01/15/2019 (Exact Date)   SpO2 97%   BMI 31.00 kg/m²

## 2019-08-12 NOTE — OP NOTE
Section Procedure Note    Indications: Patient is a 28-year-old  Ab1 white female at 29 weeks 6 days gestation admitted in active labor with vertex breech twin presentation.  Patient was given steroids, antibiotics, and magnesium sulfate but progressed from 4 to 7 cm dilatation and was therefore prepared for immediate  section.    Pre-operative Diagnosis: 29 week 6 day pregnancy.  Vertex breech twins in active labor    Post-operative Diagnosis: same    Surgeon: Derek Galaviz MD     Assistants: Micheline    Anesthesia: Spinal anesthesia    ASA Class: 2    Procedure Details   The patient was seen in the Holding Room. The risks, benefits, complications, treatment options, and expected outcomes were discussed with the patient.  The patient concurred with the proposed plan, giving informed consent.  The site of surgery properly noted/marked. The patient was taken to Operating Room , identified as Iwona Navarro and the procedure verified as  Delivery. A Time Out was held and the above information confirmed.    After induction of anesthesia, the patient was draped and prepped in the usual sterile manner. A Pfannenstiel incision was made and carried down through the subcutaneous tissue to the fascia. Fascial incision was made and extended transversely. The fascia was  from the underlying rectus tissue superiorly and inferiorly. The peritoneum was identified and entered. Peritoneal incision was extended longitudinally. The utero-vesical peritoneal reflection was incised transversely and the bladder flap was bluntly freed from the lower uterine segment. A low transverse uterine incision was made. Delivered from vertex presentation was twin A female weighing approximately 3 pounds with Apgars of 8 and 9.  The cord clamp was placed on this cord for recognition later.  The second sac was then rupture of clear fluid and twin B, also female, which was breech, was pulled down and delivered  vertex with Apgars of 8 and 9 and weight also around 3 pounds.  Cord blood was then drawn from each cord as were arterial cord pH is from each cord.. The placenta was removed intact and appeared normal with 3 vessel cords. The uterine outline, tubes and ovaries appeared normal. The uterine incision was closed with running locked sutures of 0 chromic. Hemostasis was observed. Lavage was carried out until clear.  The muscles were then approximated in the midline with interrupted 0 chromic sutures.  The fascia was then reapproximated with running sutures of 0 Vicryl. The skin was closed with subcuticular 4-0 Vicryl.  Pt transferred to recovery room in satisfactory condition. Pt received IV clindamycin and gentamicin preop.  Blood type A   RH positive.  Rubella status immune    Instrument, sponge, and needle counts were correct prior to the abdominal closure and at the conclusion of the case.     Findings:  Live viable female infant twins weighing approximately 3 pounds each with Apgars of 8 and 9 each    Estimated Blood Loss: 800 mL    Specimens:   Order Name Source Comment Collection Info Order Time   BLOOD GAS, ARTERIAL, CORD Umbilical Cord   Baby B  8/12/2019  6:21 AM   BLOOD GAS, ARTERIAL, CORD Umbilical Cord   Baby A  8/12/2019  6:21 AM                      Complications: None; patient tolerated the procedure well.                    Condition: Stable    Attending Attestation: Derek Galaviz MD

## 2019-08-12 NOTE — H&P
H&P Note    Patient Identification:  Name: Iwona Navarro  Age: 28 y.o.  Sex: female  :  1990  MRN: [id number]                       Chief Complaint: Spotting    History of Present Illness:   Patient is a 28-year-old G2, P0 Ab1 white female at 29 weeks 6 days gestation who came in this morning with complaint of increasing vaginal spotting with occasional contractions.  Upon arriving to labor and delivery she was noted to be 4 cm dilated and 80% effaced.  She was given steroids and started on magnesium with a 6 g loading dose and 900 mg of clindamycin IV due to a penicillin allergy.  Very soon after this she was noted to be 7 cm dilated and ultrasound verified vertex breech presentation of her twin pregnancy.  She was therefore prepared for an immediate  section after starting gentamicin prophylaxis which was dosed by the pharmacy.  Patient does have a history of chronic hypertension and has been on labetalol 200 mg twice daily which will be continued.    Problem List:  [unfilled]  Past Medical History:  Past Medical History:   Diagnosis Date   • ACE-inhibitor cough    • Hypertension    • Migraines    • Palpitations    • Varicella      Past Surgical History:  Past Surgical History:   Procedure Laterality Date   • D&C WITH SUCTION     • WISDOM TOOTH EXTRACTION        Home Meds:  Medications Prior to Admission   Medication Sig Dispense Refill Last Dose   • docusate sodium (COLACE) 100 MG capsule Take 100 mg by mouth 2 (Two) Times a Day.   2019 at Unknown time   • ferrous sulfate 325 (65 FE) MG tablet Take 1 tablet by mouth Daily With Breakfast. 30 tablet 6 2019 at Unknown time   • labetalol (NORMODYNE) 200 MG tablet Take 1 tablet by mouth 2 (Two) Times a Day. (Patient taking differently: Take 200 mg by mouth Daily.) 60 tablet 1 2019 at Unknown time   • Prenatal Vit-Fe Fumarate-FA (PRENATAL, CLASSIC, VITAMIN) 28-0.8 MG tablet tablet Take  by mouth Daily.   2019 at Unknown time   •  Probiotic Product (PROBIOTIC ADVANCED PO) Take  by mouth.   2019 at Unknown time   • Multiple Vitamins-Minerals (MULTI COMPLETE PO) Take  by mouth.   Taking   • SUMAtriptan (IMITREX) 50 MG tablet Take 50 mg by mouth Every 2 (Two) Hours As Needed for Migraine. Take one tablet at onset of headache. May repeat dose one time in 2 hours if headache not relieved.   Taking   • triamcinolone (KENALOG) 0.1 % lotion Apply  topically 3 (Three) Times a Day.   Taking     Current Meds:   [unfilled]  Allergies:  Allergies   Allergen Reactions   • Penicillins Hives   • Ace Inhibitors Cough     Immunizations:    There is no immunization history on file for this patient.  Social History:   Social History     Tobacco Use   • Smoking status: Former Smoker     Packs/day: 1.00     Years: 10.00     Pack years: 10.00     Last attempt to quit: 2016     Years since quitting: 3.6   • Smokeless tobacco: Never Used   • Tobacco comment: Smoked 1 pack per week for 10 years - quit 2016   Substance Use Topics   • Alcohol use: No     Frequency: Never     Comment: Socially       Family History:  Family History   Problem Relation Age of Onset   • Hypertension Father    • Diabetes Father    • Colon cancer Paternal Grandfather    • Ovarian cancer Paternal Grandmother         Review of Systems  Pertinent items are noted in HPI.    Objective:  tMax 24 hrs: Temp (24hrs), Av.9 °F (37.2 °C), Min:98.9 °F (37.2 °C), Max:98.9 °F (37.2 °C)    Vitals Ranges:   Temp:  [98.9 °F (37.2 °C)] 98.9 °F (37.2 °C)  Heart Rate:  [] 100  Resp:  [18] 18  BP: (130-147)/(80-95) 133/80  Intake and Output Last 3 Shifts:   No intake/output data recorded.    Exam:     General Appearance:    Alert, cooperative, no distress, appears stated age   Head:    Normocephalic, without obvious abnormality, atraumatic   Back:     Symmetric, no curvature, ROM normal, no CVA tenderness   Lungs:     Clear to auscultation bilaterally, respirations unlabored   Chest Wall:    No  tenderness or deformity    Heart:    Regular rate and rhythm, S1 and S2 normal, no murmur, rub   or gallop       Abdomen:     Soft, non-tender, twin 29 weeks size gestation with reactive fetal heart tones x2   Genitalia:    Normal female without lesion, discharge or tenderness.  Cervix per my exam prior to  completely effaced and 7 cm dilated with the vertex at 0 station.       Extremities:   Extremities normal, atraumatic, no cyanosis or edema   Skin:   Skin color, texture, turgor normal, no rashes or lesions           Data Review:  A positive       rubella immune  Lab Results (last 24 hours)     Procedure Component Value Units Date/Time    Blood Gas, Arterial, Cord [[id number]]  (Abnormal) Collected:  19    Specimen:  Cord Blood Arterial from Umbilical Cord Updated:  19     Site N/A     Comment: N/A        pH, Cord Arterial 7.36 pH Units      pCO2, Cord Arterial 39.7 mmHg      pO2, Cord Arterial 20.6 mmHg      HCO3, Cord Arterial 22.6 mmol/L      Base Exc, Cord Arterial -2.5 mmol/L      Barometric Pressure for Blood Gas 751.0 mmHg      Modality Room Air     O2 Saturation Calculated 31.8 %      Note --    Blood Gas, Arterial, Cord [[id number]]  (Abnormal) Collected:  19    Specimen:  Cord Blood Arterial from Umbilical Cord Updated:  19     Site N/A     Comment: N/A        pH, Cord Arterial 7.32 pH Units      pCO2, Cord Arterial 46.9 mmHg      pO2, Cord Arterial 13.9 mmHg      HCO3, Cord Arterial 24.1 mmol/L      Base Exc, Cord Arterial -2.4 mmol/L      Barometric Pressure for Blood Gas 750.6 mmHg      Modality Room Air     O2 Saturation Calculated 14.6 %      Note --    Comprehensive Metabolic Panel [[id number]]  (Abnormal) Collected:  19    Specimen:  Blood from Arm, Left Updated:  19     Glucose 85 mg/dL      BUN 6 mg/dL      Creatinine 0.52 mg/dL      Sodium 138 mmol/L      Potassium 3.7 mmol/L      Chloride 106 mmol/L      CO2 17.6 mmol/L       Calcium 9.1 mg/dL      Total Protein 6.1 g/dL      Albumin 3.70 g/dL      ALT (SGPT) 114 U/L      AST (SGOT) 60 U/L      Alkaline Phosphatase 74 U/L      Total Bilirubin 0.4 mg/dL      eGFR Non African Amer 140 mL/min/1.73      Globulin 2.4 gm/dL      A/G Ratio 1.5 g/dL      BUN/Creatinine Ratio 11.5     Anion Gap 14.4 mmol/L     Narrative:       GFR Normal >60  Chronic Kidney Disease <60  Kidney Failure <15    CBC (No Diff) [[id number]]  (Abnormal) Collected:  19    Specimen:  Blood from Arm, Left Updated:  19     WBC 11.87 10*3/mm3      RBC 3.89 10*6/mm3      Hemoglobin 12.0 g/dL      Hematocrit 36.2 %      MCV 93.1 fL      MCH 30.8 pg      MCHC 33.1 g/dL      RDW 13.4 %      RDW-SD 45.7 fl      MPV 12.4 fL      Platelets 192 10*3/mm3     Urinalysis, Microscopic Only - Urine, Clean Catch [[id number]]  (Abnormal) Collected:  19    Specimen:  Urine, Clean Catch Updated:  19     RBC, UA 0-2 /HPF      WBC, UA 21-30 /HPF      Bacteria, UA 2+ /HPF      Squamous Epithelial Cells, UA 3-6 /HPF      Hyaline Casts, UA None Seen /LPF      Methodology Automated Microscopy    Urine Culture - Urine, Urine, Clean Catch [[id number]] Collected:  19    Specimen:  Urine, Clean Catch Updated:  19    Urinalysis With Culture If Indicated - Urine, Clean Catch [[id number]]  (Abnormal) Collected:  19    Specimen:  Urine, Clean Catch Updated:  19     Color, UA Yellow     Appearance, UA Cloudy     pH, UA 6.5     Specific Gravity, UA 1.020     Glucose, UA Negative     Ketones, UA Negative     Bilirubin, UA Negative     Blood, UA Large (3+)     Protein, UA Negative     Leuk Esterase, UA Moderate (2+)     Nitrite, UA Negative     Urobilinogen, UA 1.0 E.U./dL        Assessment:    Pregnancy      1.  29 weeks 6 days gestation with vertex breech twins in active labor    Plan:  1.  Plan for immediate  section under spinal anesthesia.    Derek Galaviz,  MD  8/12/2019

## 2019-08-12 NOTE — PLAN OF CARE
Problem: Anesthesia/Analgesia, Neuraxial (Obstetrics)  Goal: Signs and Symptoms of Listed Potential Problems Will be Absent, Minimized or Managed (Anesthesia/Analgesia, Neuraxial)  Outcome: Ongoing (interventions implemented as appropriate)   08/12/19 0600   Goal/Outcome Evaluation   Problems Assessed (Neuraxial Anesthesia/Analgesia, OB) all

## 2019-08-12 NOTE — LACTATION NOTE
This note was copied from a baby's chart.  P1 29 week twin girls in NICU. HGP initiated. RN norah up ebm in syringe and took to NICU. Encouraged pumping every 3 hrs. She already has her personal pump.

## 2019-08-12 NOTE — ANESTHESIA PREPROCEDURE EVALUATION
Anesthesia Evaluation     Patient summary reviewed   no history of anesthetic complications:  NPO Solid Status: Waived due to emergency             Airway   Mallampati: II  TM distance: >3 FB  Neck ROM: full  No difficulty expected  Dental      Pulmonary - normal exam   (+) a smoker Former,   Cardiovascular - normal exam    (+) hypertension,   (-) angina      Neuro/Psych  GI/Hepatic/Renal/Endo      Musculoskeletal     Abdominal    Substance History      OB/GYN    (+) Pregnant (twins 29W6D),         Other        (-) blood dyscrasia                  Anesthesia Plan    ASA 2 - emergent     spinal     Anesthetic plan, all risks, benefits, and alternatives have been provided, discussed and informed consent has been obtained with: patient.

## 2019-08-12 NOTE — ED NOTES
PT reports 29 weeks pregnant with twins, noticed some spotting this am, throughout the day. Pt reports some suprapubic pressure but no abd pain. Pt states she had positive at home UTI test.      Amado Fine RN  08/12/19 6529

## 2019-08-12 NOTE — PLAN OF CARE
Problem:  Delivery (Adult,Obstetrics,Pediatric)  Goal: Signs and Symptoms of Listed Potential Problems Will be Absent, Minimized or Managed ( Delivery)  Outcome: Ongoing (interventions implemented as appropriate)   19 0600   Goal/Outcome Evaluation   Problems Assessed ( Delivery) all   Problems Present ( Delivery) none

## 2019-08-12 NOTE — PROGRESS NOTES
OB Note    Patient awake and alert.  Infants in NICU and doing well.  Only requiring supplemental oxygen.  VSS, afebrile.  Dressing dry.  Continue hospital course.    Jersey Christy MD

## 2019-08-13 LAB
ALBUMIN SERPL-MCNC: 2.9 G/DL (ref 3.5–5.2)
ALBUMIN/GLOB SERPL: 1.1 G/DL
ALP SERPL-CCNC: 59 U/L (ref 39–117)
ALT SERPL W P-5'-P-CCNC: 87 U/L (ref 1–33)
ANION GAP SERPL CALCULATED.3IONS-SCNC: 9.5 MMOL/L (ref 5–15)
AST SERPL-CCNC: 51 U/L (ref 1–32)
BACTERIA SPEC AEROBE CULT: ABNORMAL
BASOPHILS # BLD AUTO: 0.04 10*3/MM3 (ref 0–0.2)
BASOPHILS NFR BLD AUTO: 0.3 % (ref 0–1.5)
BILIRUB SERPL-MCNC: 0.4 MG/DL (ref 0.2–1.2)
BUN BLD-MCNC: 6 MG/DL (ref 6–20)
BUN/CREAT SERPL: 10.7 (ref 7–25)
CALCIUM SPEC-SCNC: 8.5 MG/DL (ref 8.6–10.5)
CHLORIDE SERPL-SCNC: 101 MMOL/L (ref 98–107)
CO2 SERPL-SCNC: 21.5 MMOL/L (ref 22–29)
CREAT BLD-MCNC: 0.56 MG/DL (ref 0.57–1)
DEPRECATED RDW RBC AUTO: 50.2 FL (ref 37–54)
DIFFERENTIAL METHOD BLD: ABNORMAL
EOSINOPHIL # BLD AUTO: 0.22 10*3/MM3 (ref 0–0.4)
EOSINOPHIL NFR BLD AUTO: 1.9 % (ref 0.3–6.2)
ERYTHROCYTE [DISTWIDTH] IN BLOOD BY AUTOMATED COUNT: 13.9 % (ref 12.3–15.4)
GFR SERPL CREATININE-BSD FRML MDRD: 129 ML/MIN/1.73
GLOBULIN UR ELPH-MCNC: 2.6 GM/DL
GLUCOSE BLD-MCNC: 82 MG/DL (ref 65–99)
HCT VFR BLD AUTO: 39.6 % (ref 34–46.6)
HGB BLD-MCNC: 12.4 G/DL (ref 12–15.9)
IMM GRANULOCYTES # BLD AUTO: 0.05 10*3/MM3 (ref 0–0.05)
IMM GRANULOCYTES NFR BLD AUTO: 0.4 % (ref 0–0.5)
LYMPHOCYTES # BLD AUTO: 2.01 10*3/MM3 (ref 0.7–3.1)
LYMPHOCYTES NFR BLD AUTO: 16.9 % (ref 19.6–45.3)
MCH RBC QN AUTO: 31.1 PG (ref 26.6–33)
MCHC RBC AUTO-ENTMCNC: 31.3 G/DL (ref 31.5–35.7)
MCV RBC AUTO: 99.2 FL (ref 79–97)
MONOCYTES # BLD AUTO: 0.9 10*3/MM3 (ref 0.1–0.9)
MONOCYTES NFR BLD AUTO: 7.6 % (ref 5–12)
NEUTROPHILS # BLD AUTO: 8.64 10*3/MM3 (ref 1.7–7)
NEUTROPHILS NFR BLD AUTO: 72.9 % (ref 42.7–76)
NRBC BLD AUTO-RTO: 0 /100 WBC (ref 0–0.2)
PLAT MORPH BLD: NORMAL
PLATELET # BLD AUTO: 193 10*3/MM3 (ref 140–450)
PMV BLD AUTO: 12.6 FL (ref 6–12)
POTASSIUM BLD-SCNC: 4.3 MMOL/L (ref 3.5–5.2)
PROT SERPL-MCNC: 5.5 G/DL (ref 6–8.5)
RBC # BLD AUTO: 3.99 10*6/MM3 (ref 3.77–5.28)
RBC MORPH BLD: NORMAL
SODIUM BLD-SCNC: 132 MMOL/L (ref 136–145)
WBC # BLD AUTO: 11.86 10*3/MM3 (ref 3.4–10.8)
WBC MORPH BLD: NORMAL
WBC NRBC COR # BLD: 11.86 10*3/MM3 (ref 3.4–10.8)

## 2019-08-13 PROCEDURE — 80053 COMPREHEN METABOLIC PANEL: CPT | Performed by: OBSTETRICS & GYNECOLOGY

## 2019-08-13 RX ORDER — LABETALOL 100 MG/1
100 TABLET, FILM COATED ORAL 2 TIMES DAILY
Status: DISCONTINUED | OUTPATIENT
Start: 2019-08-13 | End: 2019-08-16 | Stop reason: HOSPADM

## 2019-08-13 RX ADMIN — IBUPROFEN 600 MG: 600 TABLET ORAL at 09:03

## 2019-08-13 RX ADMIN — LABETALOL HCL 200 MG: 200 TABLET, FILM COATED ORAL at 10:17

## 2019-08-13 RX ADMIN — IBUPROFEN 600 MG: 600 TABLET ORAL at 20:37

## 2019-08-13 RX ADMIN — DOCUSATE SODIUM 100 MG: 100 CAPSULE, LIQUID FILLED ORAL at 09:03

## 2019-08-13 RX ADMIN — Medication: at 09:03

## 2019-08-13 RX ADMIN — Medication 1 TABLET: at 09:03

## 2019-08-13 RX ADMIN — OXYCODONE HYDROCHLORIDE AND ACETAMINOPHEN 2 TABLET: 5; 325 TABLET ORAL at 14:09

## 2019-08-13 RX ADMIN — SIMETHICONE CHEW TAB 80 MG 80 MG: 80 TABLET ORAL at 09:03

## 2019-08-13 RX ADMIN — SIMETHICONE CHEW TAB 80 MG 80 MG: 80 TABLET ORAL at 20:37

## 2019-08-13 NOTE — PLAN OF CARE
Problem: Postpartum ( Delivery) (Adult,Obstetrics,Pediatric)  Goal: Signs and Symptoms of Listed Potential Problems Will be Absent, Minimized or Managed (Postpartum)  Outcome: Ongoing (interventions implemented as appropriate)   19 4780   Goal/Outcome Evaluation   Problems Assessed (Postpartum ) all   Problems Present (Postpartum ) none

## 2019-08-13 NOTE — PROGRESS NOTES
Section Progress Note    Assessment/Plan     Status post  section: Doing well postoperatively.     Postop care--continue routine postoperative care.  Awaiting spontaneous void and flatus.  May shower and remove bandage.  Chronic hypertension--blood pressures well controlled on labetalol  Elevated liver enzymes--unclear etiology.  Trending down today.  Patient has no si/sx concerning for HELLP.  Her LFTs were normal in March.  We will repeat tomorrow morning to make sure they are continuing to trend down.    Rh status: A pos  Rubella: Imm  Gender: female/female--in NICU for prematurity    Subjective     Postpartum Day 1:  Delivery    The patient feels well. The patient denies emotional concerns. Pain is well controlled with current medications. The babies are in NICU.Urinary output is adequate. The patient is ambulating well. The patient is tolerating a normal diet. Patient denies flatus.    Objective     Vital signs in last 24 hours:  Temp:  [97.3 °F (36.3 °C)-99.3 °F (37.4 °C)] 97.7 °F (36.5 °C)  Heart Rate:  [65-90] 80  Resp:  [16-18] 18  BP: (109-131)/(69-93) 131/87      General:    alert, appears stated age and cooperative   Bowel Sounds:  active   Lochia:  appropriate   Uterine Fundus:   firm   Incision:  bandage in place   DVT Evaluation:  No evidence of DVT seen on physical exam.     Lab Results   Component Value Date    WBC 11.87 (H) 2019    WBC 11.86 (H) 2019    WBC 11.86 (H) 2019    HGB 12.0 2019    HGB 12.4 2019    HCT 36.2 2019    HCT 39.6 2019    MCV 93.1 2019    MCV 99.2 (H) 2019     2019     2019         Margaret Marin MD  2019  10:43 AM     22-Jun-2018

## 2019-08-14 LAB
ALBUMIN SERPL-MCNC: 3 G/DL (ref 3.5–5.2)
ALBUMIN/GLOB SERPL: 1.4 G/DL
ALP SERPL-CCNC: 53 U/L (ref 39–117)
ALT SERPL W P-5'-P-CCNC: 71 U/L (ref 1–33)
ANION GAP SERPL CALCULATED.3IONS-SCNC: 11.6 MMOL/L (ref 5–15)
AST SERPL-CCNC: 35 U/L (ref 1–32)
BILIRUB SERPL-MCNC: 0.2 MG/DL (ref 0.2–1.2)
BUN BLD-MCNC: 9 MG/DL (ref 6–20)
BUN/CREAT SERPL: 16.7 (ref 7–25)
CALCIUM SPEC-SCNC: 8.2 MG/DL (ref 8.6–10.5)
CHLORIDE SERPL-SCNC: 104 MMOL/L (ref 98–107)
CO2 SERPL-SCNC: 21.4 MMOL/L (ref 22–29)
CREAT BLD-MCNC: 0.54 MG/DL (ref 0.57–1)
GFR SERPL CREATININE-BSD FRML MDRD: 134 ML/MIN/1.73
GLOBULIN UR ELPH-MCNC: 2.1 GM/DL
GLUCOSE BLD-MCNC: 78 MG/DL (ref 65–99)
POTASSIUM BLD-SCNC: 3.9 MMOL/L (ref 3.5–5.2)
PROT SERPL-MCNC: 5.1 G/DL (ref 6–8.5)
SODIUM BLD-SCNC: 137 MMOL/L (ref 136–145)

## 2019-08-14 PROCEDURE — 99231 SBSQ HOSP IP/OBS SF/LOW 25: CPT | Performed by: OBSTETRICS & GYNECOLOGY

## 2019-08-14 PROCEDURE — 80053 COMPREHEN METABOLIC PANEL: CPT | Performed by: OBSTETRICS & GYNECOLOGY

## 2019-08-14 RX ADMIN — Medication 1 TABLET: at 08:26

## 2019-08-14 RX ADMIN — OXYCODONE HYDROCHLORIDE AND ACETAMINOPHEN 1 TABLET: 5; 325 TABLET ORAL at 15:06

## 2019-08-14 RX ADMIN — DOCUSATE SODIUM 100 MG: 100 CAPSULE, LIQUID FILLED ORAL at 08:27

## 2019-08-14 RX ADMIN — LABETALOL HCL 100 MG: 100 TABLET, FILM COATED ORAL at 08:26

## 2019-08-14 RX ADMIN — DOCUSATE SODIUM 100 MG: 100 CAPSULE, LIQUID FILLED ORAL at 19:38

## 2019-08-14 RX ADMIN — SIMETHICONE CHEW TAB 80 MG 80 MG: 80 TABLET ORAL at 15:06

## 2019-08-14 RX ADMIN — OXYCODONE HYDROCHLORIDE AND ACETAMINOPHEN 2 TABLET: 5; 325 TABLET ORAL at 00:57

## 2019-08-14 RX ADMIN — LABETALOL HCL 100 MG: 100 TABLET, FILM COATED ORAL at 21:09

## 2019-08-14 RX ADMIN — SIMETHICONE CHEW TAB 80 MG 80 MG: 80 TABLET ORAL at 08:27

## 2019-08-14 RX ADMIN — IBUPROFEN 600 MG: 600 TABLET ORAL at 08:27

## 2019-08-14 RX ADMIN — IBUPROFEN 600 MG: 600 TABLET ORAL at 17:29

## 2019-08-14 NOTE — PROGRESS NOTES
Discharge Planning Assessment  [facility]     Patient Name: Iwona Navarro  MRN: [id number]  Today's Date: 8/14/2019    Admit Date: 8/12/2019    Discharge Needs Assessment    No documentation.       Discharge Plan     Row Name 08/14/19 1148       Plan    Plan  Infants to discharge home with mother when medically ready. CSW will follow up with mother to ensure car seats were obtained and a pediatrician was chosen. Elisa Dang, CSW    Plan Comments  Mother: Iwona Navarro, MRN [id number]; Infant: Elham Navarro, MRN [id number]; Infant: Elham Navarro, MRN [id number]. CSW was not consulted but met with mother to offer support and resources as infants are in the NICU. CSW met with mother alone at bedside. Mother verified address, phone and insurance. Mother reports spouse is aware of process to add infants to insurance. Mother reports it is just she and spouse/father of infants in the home. Father/spouse is Tom June, [phone]. Mother reports the twins are their first children. HANDS program discussed with mother and info provided, mother declined referral. Mother reports she has a degree in early childhood development and spouse is a respiratory therapist. Mother reports a limited support system with spouse/father and maternal uncle of infants. Mother is breast feeding and supplementing with formula. Mother is not current with WIC program. WIC program discussed with mother and info on WIC provided. Mother reports they have cribs, clothes, diapers and other supplies. Mother reports they are in process of buying car seats for the infants and are looking for ones that can hold children under 5 lbs. Mother verified her prenatal care with Dr. Marin and is unsure of where she will have infants go for pediatrician at this time. Mother discussed calling insurance to see who is in network close to home. Mother reports she does feel comfortable scheduling follow up appointments and will have  transportation to appointments. Mother denies any violence, threats, or feeling unsafe. CSW provided support and encouragement to mother regarding breast feeding and infants being in the NICU. Mother is unsure if any specialist follow up will be needed. Of note, the twins may qualify for SSI for low birth weight, will discuss with MD prior to discussing with mother. Of note, no urine toxicology obtained on mother nor infants. Cord toxicology was not sent on infants. Per mother's prenatal chart, toxicology screens are not indicated at this time. Mother appropriate and open with CSW during conversation. CSW will continue to follow to assist with discharge needs. CSW will follow up with MD to see if SSI for low birth weight is indicated. CSW will also follow up with mother later to ensure mother was able to get car seats for infants and found a pediatrician for infants. CSW provided mother with CSW card and advised that CSW is here to assist and to feel free to contact CSW if any needs. Elisa Dang, CSW        Destination      No service coordination in this encounter.      Durable Medical Equipment      No service coordination in this encounter.      Dialysis/Infusion      No service coordination in this encounter.      Home Medical Care      No service coordination in this encounter.      Therapy      No service coordination in this encounter.      Community Resources      No service coordination in this encounter.          Demographic Summary     Row Name 08/14/19 1147       General Information    Admission Type  inpatient    Arrived From  home    Referral Source  other (see comments)    Reason for Consult  other (see comments)    General Information Comments  CSW was not consulted but met with mother to offer support and resources as infants are in the NICU        Functional Status     Row Name 08/14/19 1146       Mental Status    General Appearance WDL  WDL       Mental Status Summary    Recent Changes in Mental  Status/Cognitive Functioning  no changes       Employment/    Employment Status  employed full time        Psychosocial     Row Name 08/14/19 1148       Behavior WDL    Behavior WDL  WDL       Emotion Mood WDL    Emotion/Mood/Affect WDL  WDL       Speech WDL    Speech WDL  WDL       Perceptual State WDL    Perceptual State WDL  WDL       Thought Process WDL    Thought Process WDL  WDL       Intellectual Performance WDL    Intellectual Performance WDL  WDL       Coping/Stress    Major Change/Loss/Stressor  birth;medical condition/diagnosis    Patient Personal Strengths  able to adapt;flexibility;future/goal oriented;motivated;positive attitude;resilient;self-reliant    Sources of Support  sibling(s);spouse        Abuse/Neglect     Row Name 08/14/19 1148       Personal Safety    Feels Unsafe at Home or Work/School  no    Feels Threatened by Someone  no    Does Anyone Try to Keep You From Having Contact with Others or Doing Things Outside Your Home?  no    Physical Signs of Abuse Present  no        Legal    No documentation.       Substance Abuse    No documentation.       Patient Forms    No documentation.           JOSE JUAN Puckett

## 2019-08-14 NOTE — PLAN OF CARE
Problem: Patient Care Overview  Goal: Plan of Care Review   08/14/19 9357   Coping/Psychosocial   Plan of Care Reviewed With patient   Plan of Care Review   Progress improving

## 2019-08-14 NOTE — LACTATION NOTE
Mom cont to hand express and use hgp. She takes colostrum to NICU for her babies. Educated on diet and using sterilization bag for pump pieces once a day. Gave mom lactation cookies recipe and encouraged mom not to take fenugreek. Gave mom info to rent hgp. Encouraged to call if needing further assistance.   Lactation Consult Note    Evaluation Completed: 2019 1:55 PM  Patient Name: Iwona Navarro  :  1990  MRN:  [id number]     REFERRAL  INFORMATION:                                         DELIVERY HISTORY:     Reji Navarroirl [[id number]]         Ramon, ChristinasGirl B [[id number]]           Ramon, JakinasGirl [[id number]]         Jak NavarroinasGirl B [[id number]]        Skin to skin initiation date/time:      Reji Navarroirl [[id number]]         Iwona NavarrosGirl B [[id number]]            Iwona NavarrosGirl [[id number]]         Jak NavarroinasGirl B [[id number]]        Skin to skin end date/time:      Iwona NavarrosGirl [[id number]]         Ramon ChristinasGirl B [[id number]]            Ramon, JakinasGirl [[id number]]         Ramon, ChristinasGirl B [[id number]]           Ramon, JakinasGirl [[id number]]         Ramon, JakinasGirl B [[id number]]          MATERNAL ASSESSMENT:                               INFANT ASSESSMENT:  Information for the patient's :  Reji Navarroirl [[id number]]   No past medical history on file.  Information for the patient's :  Iwona NavarrosGirl B [[id number]]   No past medical history on file.       Iwona NavarrosGirl [[id number]]         Jak NavarroinasGirl B [[id number]]           Jak NavarroinasGirl [[id number]]         Ramon, ChristinasGirl B [[id number]]           Ramon, JakinasGirl [[id number]]         Ramon, JakinasGirl B [[id number]]           Ramon, JakinasGirl [[id number]]         Ramon, ChristinasGirl B [[id number]]           Elham Navarro [[id number]]          Ramon, ChristinasGirl B [[id number]]           [place], ChristinasGirl [[id number]]         [place], ChristinasGirl B [[id number]]           [place], ChristinasGirl [[id number]]         [place], ChristinasGirl B [[id number]]           Ramon, ChristinasGirl [[id number]]         [place], ChristinasGirl B [[id number]]           Ramon, ChristinasGirl [[id number]]         Ramon, ChristinasGirl B [[id number]]           Ramon, ChristinasGirl [[id number]]         Ramon, ChristinasGirl B [[id number]]           [place], ChristinasGirl [[id number]]         Ramon, ChristinasGirl B [[id number]]           Ramon, ChristinasGirl [[id number]]         [place], ChristinasGirl B [[id number]]           [place], ChristinasGirl [[id number]]         [place], ChristinasGirl B [[id number]]           [place], ChristinasGirl [[id number]]         [place], ChristinasGirl B [[id number]]           [place], ChristinasGirl [[id number]]         [place], ChristinasGirl B [[id number]]           Ramon, ChristinasGirl [[id number]]         Ramon, ChristinasGirl B [[id number]]           [place], ChristinasGirl [[id number]]         Ramon, ChristinasGirl B [[id number]]           Ramon, ChristinasGirl [[id number]]         Ramon, ChristinasGirl B [[id number]]           Ramon, ChristinasGirl [[id number]]         Ramon, ChristinasGirl B [[id number]]               [place], ChristinasGirl [[id number]]         [place], ChristinasGirl B [[id number]]           [place], ChristinasGirl [[id number]]         Ramon, ChristinasGirl B [[id number]]           [place], ChristinasGirl [[id number]]         [place], ChristinasGirl B [[id number]]           Ramon, ChristinasGirl [[id number]]         Elham Navarro [[id number]]           Elham Navarro [[id number]]         Elham Navarro [[id number]]           Elham Navarro [[id number]]         Elham Navarro [[id number]]             Elham Navarro [[id number]]          Elham Navarro [[id number]]           Elham Navarro [[id number]]         Elham Navarro [[id number]]           Elham Navarro [[id number]]         Elham Navarro [[id number]]              MATERNAL INFANT FEEDING:                                                                       EQUIPMENT TYPE:                                 BREAST PUMPING:          LACTATION REFERRALS:

## 2019-08-14 NOTE — PROGRESS NOTES
DAILY PROGRESS NOTE    Patient Identification:  Name: Iwona Navarro  Age: 28 y.o.  Sex: female  :  1990  MRN: [id number]               Subjective:  Interval History: Postoperative day #2 from a  delivery.  Pain is well controlled with pain medications.  Lochia is minimal.  The babies are doing well in the NICU.    Objective:    Scheduled Meds:  labetalol 100 mg Oral BID   prenatal (CLASSIC) vitamin 1 tablet Oral Daily     Continuous Infusions:  dextrose 5 % and lactated Ringer's 125 mL/hr     PRN Meds:•  all purpose nipple ointment  •  bisacodyl  •  docusate sodium  •  ibuprofen  •  lanolin  •  magnesium hydroxide  •  ondansetron  •  ondansetron  •  oxyCODONE-acetaminophen  •  simethicone    Vital signs in last 24 hours:  Temp:  [97.1 °F (36.2 °C)-98.6 °F (37 °C)] 98.3 °F (36.8 °C)  Heart Rate:  [74-94] 79  Resp:  [16-18] 18  BP: (102-132)/(65-92) 129/84    Intake/Output:    Intake/Output Summary (Last 24 hours) at 2019 1000  Last data filed at 2019 0730  Gross per 24 hour   Intake --   Output 2800 ml   Net -2800 ml       Exam:  General Appearance:    Alert, cooperative, no distress, appears stated age   Lungs:     Clear to auscultation bilaterally, respirations unlabored    Heart:    Regular rate and rhythm, S1 and S2 normal, no murmur, rub   or gallop   Abdomen:    Soft, nondistended.  There is no right upper quadrant tenderness to palpation.  The fundus is firm.  Pfannenstiel incision is well approximated.  Erythema and induration are absent.   Extremities:  Equal in size with minimal pedal edema   Skin:   Skin color, texture, turgor normal, no rashes or lesions        Data Review:    Lab Results (last 24 hours)     Procedure Component Value Units Date/Time    Comprehensive Metabolic Panel [[id number]]  (Abnormal) Collected:  19    Specimen:  Blood Updated:  19     Glucose 78 mg/dL      BUN 9 mg/dL      Creatinine 0.54 mg/dL      Sodium 137 mmol/L       Potassium 3.9 mmol/L      Chloride 104 mmol/L      CO2 21.4 mmol/L      Calcium 8.2 mg/dL      Total Protein 5.1 g/dL      Albumin 3.00 g/dL      ALT (SGPT) 71 U/L      AST (SGOT) 35 U/L      Alkaline Phosphatase 53 U/L      Total Bilirubin 0.2 mg/dL      eGFR Non African Amer 134 mL/min/1.73      Globulin 2.1 gm/dL      A/G Ratio 1.4 g/dL      BUN/Creatinine Ratio 16.7     Anion Gap 11.6 mmol/L     Narrative:       GFR Normal >60  Chronic Kidney Disease <60  Kidney Failure <15    Urine Culture - Urine, Urine, Clean Catch [[id number]]  (Abnormal) Collected:  08/12/19 0447    Specimen:  Urine, Clean Catch Updated:  08/13/19 1114     Urine Culture >100,000 CFU/mL Corynebacterium species     Comment:   No definitive guidelines. All are susceptible to vancomycin. Resistance to penicillins & cephalosporins does occur.           Assessment:    Pregnancy    1.  Appropriate progress, postoperative day #2.  The babies are doing well in the NICU.  The patient is considering boarding after discharge.  2.  Elevated liver function tests.  The patient is asymptomatic.  Her liver function tests are improving, but remain elevated.  We will repeat them tomorrow.        Manny Morley MD  8/14/2019

## 2019-08-15 PROBLEM — Z98.891 S/P EMERGENCY CESAREAN SECTION: Status: ACTIVE | Noted: 2019-08-15

## 2019-08-15 LAB
ALBUMIN SERPL-MCNC: 3.3 G/DL (ref 3.5–5.2)
ALBUMIN/GLOB SERPL: 1.4 G/DL
ALP SERPL-CCNC: 60 U/L (ref 39–117)
ALT SERPL W P-5'-P-CCNC: 59 U/L (ref 1–33)
ANION GAP SERPL CALCULATED.3IONS-SCNC: 12.3 MMOL/L (ref 5–15)
AST SERPL-CCNC: 27 U/L (ref 1–32)
BILIRUB SERPL-MCNC: 0.3 MG/DL (ref 0.2–1.2)
BUN BLD-MCNC: 7 MG/DL (ref 6–20)
BUN/CREAT SERPL: 13.5 (ref 7–25)
CALCIUM SPEC-SCNC: 9 MG/DL (ref 8.6–10.5)
CHLORIDE SERPL-SCNC: 104 MMOL/L (ref 98–107)
CO2 SERPL-SCNC: 23.7 MMOL/L (ref 22–29)
CREAT BLD-MCNC: 0.52 MG/DL (ref 0.57–1)
GFR SERPL CREATININE-BSD FRML MDRD: 140 ML/MIN/1.73
GLOBULIN UR ELPH-MCNC: 2.4 GM/DL
GLUCOSE BLD-MCNC: 77 MG/DL (ref 65–99)
POTASSIUM BLD-SCNC: 4.2 MMOL/L (ref 3.5–5.2)
PROT SERPL-MCNC: 5.7 G/DL (ref 6–8.5)
SODIUM BLD-SCNC: 140 MMOL/L (ref 136–145)

## 2019-08-15 PROCEDURE — 80053 COMPREHEN METABOLIC PANEL: CPT | Performed by: OBSTETRICS & GYNECOLOGY

## 2019-08-15 PROCEDURE — 99232 SBSQ HOSP IP/OBS MODERATE 35: CPT | Performed by: OBSTETRICS & GYNECOLOGY

## 2019-08-15 RX ADMIN — IBUPROFEN 600 MG: 600 TABLET ORAL at 07:58

## 2019-08-15 RX ADMIN — SIMETHICONE CHEW TAB 80 MG 80 MG: 80 TABLET ORAL at 20:41

## 2019-08-15 RX ADMIN — LABETALOL HCL 100 MG: 100 TABLET, FILM COATED ORAL at 08:00

## 2019-08-15 RX ADMIN — Medication 1 TABLET: at 08:00

## 2019-08-15 RX ADMIN — OXYCODONE HYDROCHLORIDE AND ACETAMINOPHEN 2 TABLET: 5; 325 TABLET ORAL at 01:23

## 2019-08-15 RX ADMIN — DOCUSATE SODIUM 100 MG: 100 CAPSULE, LIQUID FILLED ORAL at 08:00

## 2019-08-15 RX ADMIN — IBUPROFEN 600 MG: 600 TABLET ORAL at 14:42

## 2019-08-15 RX ADMIN — OXYCODONE HYDROCHLORIDE AND ACETAMINOPHEN 2 TABLET: 5; 325 TABLET ORAL at 07:58

## 2019-08-15 RX ADMIN — DOCUSATE SODIUM 100 MG: 100 CAPSULE, LIQUID FILLED ORAL at 20:41

## 2019-08-15 RX ADMIN — LABETALOL HCL 100 MG: 100 TABLET, FILM COATED ORAL at 20:41

## 2019-08-15 NOTE — PROGRESS NOTES
[facility]   PROGRESS NOTE    Post-Op Day 3 S/P   Subjective   Subjective  Patient reports:  Pain is well controlled with prescription NSAID's including ibuprofen (Motrin) and narcotic analgesics including oxycodone/acetaminophen (Percocet, Tylox).  She is  ambulating. Tolerating diet. Tolerating po -- normal.  Intake -- c/o of tolerating po solids and tolerating po liquids.   Voiding - without difficulty; flatus reported..  Vaginal bleeding is light.    Objective    Objective     Vitals: Vital Signs Range for the last 24 hours  Temperature: Temp:  [98.1 °F (36.7 °C)-98.4 °F (36.9 °C)] 98.2 °F (36.8 °C)   Temp Source: Temp src: Oral   BP: BP: (127-137)/(79-87) 134/87   Pulse: Heart Rate:  [] 80   Respirations: Resp:  [16-18] 18   SPO2:     O2 Amount (l/min):     O2 Devices Device (Oxygen Therapy): room air   Weight:              Physical Exam    Lungs clear to auscultation bilaterally   Abdomen Soft, non-tender, normal bowel sounds; no bruits, organomegaly or masses.   Incision  healing well, no drainage, no erythema, no swelling, well approximated   Extremities extremities normal, atraumatic, no cyanosis or edema     I reviewed the patient's new clinical results.  LFTs continue to normalize.    Assessment/Plan        S/P emergency  section    Pregnancy    Assessment & Plan    Assessment:    Iwona Montejoell is Day 3  post-partum       Elham Navarro [[id number]]   , Low Transverse     Elham Navarro [[id number]]   , Low Transverse        Elham Navarro [[id number]]         Elham Navarro [[id number]]      .      Plan:  continue post op care and plan for discharge tomorrow.      Marques Gupta MD  08/15/19  10:33 AM

## 2019-08-16 VITALS
SYSTOLIC BLOOD PRESSURE: 124 MMHG | RESPIRATION RATE: 16 BRPM | HEART RATE: 72 BPM | OXYGEN SATURATION: 99 % | WEIGHT: 175 LBS | TEMPERATURE: 98.3 F | DIASTOLIC BLOOD PRESSURE: 68 MMHG | BODY MASS INDEX: 31 KG/M2

## 2019-08-16 PROCEDURE — 99238 HOSP IP/OBS DSCHRG MGMT 30/<: CPT | Performed by: OBSTETRICS & GYNECOLOGY

## 2019-08-16 RX ORDER — LABETALOL 200 MG/1
200 TABLET, FILM COATED ORAL 2 TIMES DAILY
Qty: 60 TABLET | Refills: 0 | Status: SHIPPED | OUTPATIENT
Start: 2019-08-16 | End: 2019-09-24

## 2019-08-16 RX ORDER — IBUPROFEN 600 MG/1
600 TABLET ORAL EVERY 8 HOURS PRN
Qty: 30 TABLET | Refills: 0 | Status: SHIPPED | OUTPATIENT
Start: 2019-08-16

## 2019-08-16 RX ORDER — OXYCODONE HYDROCHLORIDE AND ACETAMINOPHEN 5; 325 MG/1; MG/1
1 TABLET ORAL EVERY 6 HOURS PRN
Qty: 20 TABLET | Refills: 0 | Status: SHIPPED | OUTPATIENT
Start: 2019-08-16 | End: 2019-09-24

## 2019-08-16 RX ADMIN — Medication 1 TABLET: at 08:33

## 2019-08-16 RX ADMIN — LABETALOL HCL 100 MG: 100 TABLET, FILM COATED ORAL at 08:33

## 2019-08-16 RX ADMIN — OXYCODONE HYDROCHLORIDE AND ACETAMINOPHEN 2 TABLET: 5; 325 TABLET ORAL at 12:09

## 2019-08-16 RX ADMIN — SIMETHICONE CHEW TAB 80 MG 80 MG: 80 TABLET ORAL at 08:33

## 2019-08-16 RX ADMIN — DOCUSATE SODIUM 100 MG: 100 CAPSULE, LIQUID FILLED ORAL at 08:33

## 2019-08-16 RX ADMIN — IBUPROFEN 600 MG: 600 TABLET ORAL at 00:21

## 2019-08-16 RX ADMIN — IBUPROFEN 600 MG: 600 TABLET ORAL at 08:33

## 2019-08-16 NOTE — DISCHARGE SUMMARY
Date of Discharge:  2019    Discharge Diagnosis: 1.  Twin intrauterine pregnancy at 29-6/7 weeks  2.  Active labor  3.  Malpresentation of twin B    Presenting Problem/History of Present Illness  Pregnancy [Z34.90]  Pregnancy [Z34.90]  Pregnancy [Z34.90]       Hospital Course  Patient is a 28 y.o. female presented with active labor at 29-6/7 weeks.  Because of active labor with malpresentation of twin B, the patient was taken for  delivery.  For events surrounding delivery, please see the operative report.  The postpartum course was smooth.  By postpartum day #4, the patient was afebrile, tolerating a regular diet and her incision was healing well.  Blood pressures remained stable on labetalol.  The patient will continue this medication after discharge.  At this point, I felt that the patient was stable for discharge and she agreed.  She plans to board while her twins remain in NICU.    Procedures Performed  Procedure(s):   SECTION PRIMARY       Consults:   Consults     No orders found from 2019 to 2019.              Condition on Discharge: Stable    Vital Signs  Temp:  [98.3 °F (36.8 °C)-98.7 °F (37.1 °C)] 98.3 °F (36.8 °C)  Heart Rate:  [72-83] 72  Resp:  [16-18] 16  BP: (128-144)/(81-94) 144/94    Physical Exam:   The abdomen is soft and nondistended.  The fundus is firm and nontender.  Pfannenstiel incision is well approximated.  Erythema and induration are absent.  Extremities are equal in size with 1+ bipedal edema    Discharge Disposition  Home or Self Care    Discharge Medications     Discharge Medications      New Medications      Instructions Start Date   ibuprofen 600 MG tablet  Commonly known as:  ADVIL,MOTRIN   600 mg, Oral, Every 8 Hours PRN      oxyCODONE-acetaminophen 5-325 MG per tablet  Commonly known as:  PERCOCET   1 tablet, Oral, Every 6 Hours PRN         Changes to Medications      Instructions Start Date   labetalol 200 MG tablet  Commonly known as:   SANDY  What changed:  when to take this   200 mg, Oral, 2 Times Daily         Continue These Medications      Instructions Start Date   docusate sodium 100 MG capsule  Commonly known as:  COLACE   100 mg, Oral, 2 Times Daily      prenatal (CLASSIC) vitamin 28-0.8 MG tablet tablet   Oral, Daily      PROBIOTIC ADVANCED PO   Oral      SUMAtriptan 50 MG tablet  Commonly known as:  IMITREX   50 mg, Oral, Every 2 Hours PRN, Take one tablet at onset of headache. May repeat dose one time in 2 hours if headache not relieved.          Stop These Medications    ferrous sulfate 325 (65 FE) MG tablet     MULTI COMPLETE PO     triamcinolone 0.1 % lotion  Commonly known as:  KENALOG            Discharge Diet:     Activity at Discharge:     Follow-up Appointments  No future appointments.  Additional Instructions for the Follow-ups that You Need to Schedule     Call MD With Problems / Concerns   As directed      Instructions: Call for fever, chills, severe abdominal pain, heavy bleeding or any other concerns    Order Comments:  Instructions: Call for fever, chills, severe abdominal pain, heavy bleeding or any other concerns          Discharge Follow-up with Specified Provider: Dr Marin; 2 Weeks   As directed      To:  Dr Marin    Follow Up:  2 Weeks               Test Results Pending at Discharge       Manny Morley MD  08/16/19  10:17 AM    Time: Discharge 20 min

## 2019-08-27 ENCOUNTER — POSTPARTUM VISIT (OUTPATIENT)
Dept: OBSTETRICS AND GYNECOLOGY | Facility: CLINIC | Age: 29
End: 2019-08-27

## 2019-08-27 VITALS
DIASTOLIC BLOOD PRESSURE: 87 MMHG | HEART RATE: 74 BPM | WEIGHT: 160 LBS | SYSTOLIC BLOOD PRESSURE: 123 MMHG | BODY MASS INDEX: 28.34 KG/M2

## 2019-08-27 DIAGNOSIS — Z48.89 ENCOUNTER FOR POSTOPERATIVE WOUND CHECK: Primary | ICD-10-CM

## 2019-08-27 PROBLEM — O28.3 ECHOGENIC INTRACARDIAC FOCUS OF FETUS ON PRENATAL ULTRASOUND: Status: RESOLVED | Noted: 2019-06-05 | Resolved: 2019-08-27

## 2019-08-27 PROBLEM — O30.049 DICHORIONIC DIAMNIOTIC TWIN GESTATION: Status: RESOLVED | Noted: 2019-03-06 | Resolved: 2019-08-27

## 2019-08-27 PROBLEM — O10.919 CHRONIC HYPERTENSION AFFECTING PREGNANCY: Status: RESOLVED | Noted: 2019-03-06 | Resolved: 2019-08-27

## 2019-08-27 PROBLEM — Z34.90 PREGNANCY: Status: RESOLVED | Noted: 2019-08-12 | Resolved: 2019-08-27

## 2019-08-27 PROBLEM — O99.019 MATERNAL ANEMIA IN PREGNANCY, ANTEPARTUM: Status: RESOLVED | Noted: 2019-07-11 | Resolved: 2019-08-27

## 2019-08-27 PROCEDURE — 0503F POSTPARTUM CARE VISIT: CPT | Performed by: OBSTETRICS & GYNECOLOGY

## 2019-08-27 NOTE — PROGRESS NOTES
Subjective   Iwona Navarro is a 28 y.o. female here for 2 week incision check    History of Present Illness   Patient is 2-week status post primary  at 29 weeks and 6 days for  labor and dichorionic diamniotic twin gestation.  Patient reports that she has been doing well since surgery.  She denies any pain and is no longer using pain medications.  Patient reports having normal bowel movements and is urinating without difficulty.  She is pumping.  Her babies are in the NICU and doing well.    The following portions of the patient's history were reviewed and updated as appropriate: allergies, current medications, past family history, past medical history, past social history, past surgical history and problem list.    Review of Systems   Gastrointestinal: Negative for abdominal pain.   All other systems reviewed and are negative.      Objective   Physical Exam   Constitutional: She appears well-developed and well-nourished.   Cardiovascular: Normal rate and regular rhythm.   Pulmonary/Chest: Effort normal and breath sounds normal.   Abdominal: Soft. She exhibits no distension. There is no tenderness.   Incision healing well   Neurological: She is alert.   Psychiatric: She has a normal mood and affect.   Vitals reviewed.        Assessment/Plan   Iwona was seen today for postpartum care.    Diagnoses and all orders for this visit:    Encounter for postoperative wound check    Patient is doing well and incision is healing well.  Discussed continued lifting restrictions.  Patient may return to work while her babies are in the NICU and then take more time off after they are discharged.  She will follow-up in 4 weeks for postpartum physical.

## 2019-09-24 ENCOUNTER — POSTPARTUM VISIT (OUTPATIENT)
Dept: OBSTETRICS AND GYNECOLOGY | Facility: CLINIC | Age: 29
End: 2019-09-24

## 2019-09-24 VITALS
HEART RATE: 74 BPM | SYSTOLIC BLOOD PRESSURE: 124 MMHG | BODY MASS INDEX: 28.34 KG/M2 | DIASTOLIC BLOOD PRESSURE: 89 MMHG | WEIGHT: 160 LBS

## 2019-09-24 DIAGNOSIS — Z30.011 ENCOUNTER FOR INITIAL PRESCRIPTION OF CONTRACEPTIVE PILLS: ICD-10-CM

## 2019-09-24 PROCEDURE — 0503F POSTPARTUM CARE VISIT: CPT | Performed by: OBSTETRICS & GYNECOLOGY

## 2019-09-24 RX ORDER — ACETAMINOPHEN AND CODEINE PHOSPHATE 120; 12 MG/5ML; MG/5ML
1 SOLUTION ORAL DAILY
Qty: 28 TABLET | Refills: 12 | Status: SHIPPED | OUTPATIENT
Start: 2019-09-24 | End: 2020-09-23

## 2019-09-24 NOTE — PROGRESS NOTES
Subjective   Iwona aNvarro is a 28 y.o. female here for 6 week post partum exam     History of Present Illness   Patient is 6 weeks status post primary  at 29 weeks for dichorionic diamniotic twins and  labor.  Patient's babies are both still in the NICU, but doing well.  Mom has no complaints today and denies any pain.  She is breast-feeding.  Patient is voiding without difficulty and having normal bowel movements.  She denies any concern for postpartum depression or anxiety.    The following portions of the patient's history were reviewed and updated as appropriate: allergies, current medications, past family history, past medical history, past social history, past surgical history and problem list.    Review of Systems   Gastrointestinal: Negative for abdominal pain.   Genitourinary: Negative for pelvic pain.   All other systems reviewed and are negative.      Objective   Physical Exam  Physical Exam   Constitutional: She appears well-developed and well-nourished.   Cardiovascular: Normal rate and regular rhythm.    Pulmonary/Chest: Effort normal and breath sounds normal. Right breast exhibits no inverted nipple, no mass, no nipple discharge, no skin change and no tenderness. Left breast exhibits no inverted nipple, no mass, no nipple discharge, no skin change and no tenderness.   Abdominal: Soft. She exhibits no distension. There is no tenderness.  Incision well healed.  Genitourinary: Vagina normal and uterus normal. There is no rash, tenderness, lesion or injury on the right labia. There is no rash, tenderness, lesion or injury on the left labia. Cervix exhibits no motion tenderness, no discharge and no friability. Right adnexum displays no mass, no tenderness and no fullness. Left adnexum displays no mass, no tenderness and no fullness.   Neurological: She is alert.   Psychiatric: She has a normal mood and affect.   Vitals reviewed.      Assessment/Plan   Iwona was seen today for  postpartum care.    Diagnoses and all orders for this visit:    Routine postpartum follow-up    Encounter for initial prescription of contraceptive pills  -     norethindrone (MICRONOR) 0.35 MG tablet; Take 1 tablet by mouth Daily.      Patient is doing well postpartum.  Patient may resume all activity with no restrictions.  She desired to restart a birth control pill for contraception and prescription for the progesterone only pill was sent to her pharmacy due to her breast-feeding.  Discussed with patient decreased efficacy of the progesterone only pill.  Patient may follow-up in 1 year for next annual exam or sooner if needed.

## 2020-09-14 DIAGNOSIS — Z30.011 ENCOUNTER FOR INITIAL PRESCRIPTION OF CONTRACEPTIVE PILLS: ICD-10-CM

## 2020-09-14 RX ORDER — ACETAMINOPHEN AND CODEINE PHOSPHATE 120; 12 MG/5ML; MG/5ML
SOLUTION ORAL
Qty: 84 TABLET | Refills: 11 | OUTPATIENT
Start: 2020-09-14

## 2021-04-16 ENCOUNTER — BULK ORDERING (OUTPATIENT)
Dept: CASE MANAGEMENT | Facility: OTHER | Age: 31
End: 2021-04-16

## 2021-04-16 DIAGNOSIS — Z23 IMMUNIZATION DUE: ICD-10-CM

## 2021-08-09 NOTE — NURSING NOTE
RN closed delivery summaries. They were already completed, RN did not attend delivery or participate in care.   Nsaids Pregnancy And Lactation Text: These medications are considered safe up to 30 weeks gestation. It is excreted in breast milk.

## 2024-12-14 NOTE — ANESTHESIA PROCEDURE NOTES
Intrathecal Block      Patient reassessed immediately prior to procedure    Stop Time: 8/12/2019 6:03 AM  Performed By  Anesthesiologist: Jim Mills MD  Preanesthetic Checklist  Completed: patient identified, site marked, surgical consent, pre-op evaluation, timeout performed, IV checked, risks and benefits discussed and monitors and equipment checked  Intrathecal Block Prep:  Pt Position:sitting  Sterile Tech:sterile barrier, gloves and cap  Prep:chloraprep  Monitoring:blood pressure monitoring, continuous pulse oximetry and EKG  Intrathecal Block Procedure:  Approach:midline  Guidance:landmark technique  Location:L3-L4  Needle Type:Edi  Needle Gauge:25 G  Placement of Needle Event: cerebrospinal fluid  Fluid Appearance:clear  Post Assessment:  Patient Tolerance:patient tolerated the procedure well with no apparent complications            
- - -

## (undated) DEVICE — ANTIBACTERIAL UNDYED BRAIDED (POLYGLACTIN 910), SYNTHETIC ABSORBABLE SUTURE: Brand: COATED VICRYL

## (undated) DEVICE — SOL IRR H2O BTL 1000ML STRL

## (undated) DEVICE — GLV SURG TRIUMPH CLASSIC PF LTX 7 STRL

## (undated) DEVICE — KENDALL SCD EXPRESS SLEEVES, KNEE LENGTH, MEDIUM: Brand: KENDALL SCD

## (undated) DEVICE — 3M(TM) TEGADERM(TM) TRANSPARENT FILM DRESSING FRAME STYLE 1627: Brand: 3M™ TEGADERM™

## (undated) DEVICE — SUT VIC 4/0 KS 27IN VCP662H

## (undated) DEVICE — SUT GUT CHRM 0 CT 27IN 914H